# Patient Record
Sex: FEMALE | Race: BLACK OR AFRICAN AMERICAN | NOT HISPANIC OR LATINO | URBAN - METROPOLITAN AREA
[De-identification: names, ages, dates, MRNs, and addresses within clinical notes are randomized per-mention and may not be internally consistent; named-entity substitution may affect disease eponyms.]

---

## 2018-08-10 ENCOUNTER — EMERGENCY (EMERGENCY)
Facility: HOSPITAL | Age: 68
LOS: 1 days | Discharge: HOME | End: 2018-08-10
Attending: EMERGENCY MEDICINE
Payer: COMMERCIAL

## 2018-08-10 VITALS
RESPIRATION RATE: 20 BRPM | TEMPERATURE: 98 F | OXYGEN SATURATION: 96 % | DIASTOLIC BLOOD PRESSURE: 85 MMHG | SYSTOLIC BLOOD PRESSURE: 138 MMHG | HEART RATE: 84 BPM

## 2018-08-10 DIAGNOSIS — Z90.49 ACQUIRED ABSENCE OF OTHER SPECIFIED PARTS OF DIGESTIVE TRACT: Chronic | ICD-10-CM

## 2018-08-10 PROCEDURE — 99283 EMERGENCY DEPT VISIT LOW MDM: CPT

## 2018-08-10 RX ORDER — TOBRAMYCIN SULFATE 40 MG/ML
1 VIAL (ML) INJECTION
Qty: 0 | Refills: 0 | Status: DISCONTINUED | OUTPATIENT
Start: 2018-08-10 | End: 2018-08-14

## 2018-08-10 RX ORDER — VANCOMYCIN HCL 1 G
1 VIAL (EA) INTRAVENOUS
Qty: 0 | Refills: 0 | Status: DISCONTINUED | OUTPATIENT
Start: 2018-08-10 | End: 2018-08-14

## 2018-08-10 RX ORDER — CYCLOPENTOLATE HYDROCHLORIDE 10 MG/ML
1 SOLUTION/ DROPS OPHTHALMIC ONCE
Qty: 0 | Refills: 0 | Status: COMPLETED | OUTPATIENT
Start: 2018-08-10 | End: 2018-08-10

## 2018-08-10 RX ADMIN — CYCLOPENTOLATE HYDROCHLORIDE 1 DROP(S): 10 SOLUTION/ DROPS OPHTHALMIC at 22:44

## 2018-08-10 RX ADMIN — Medication 1 DROP(S): at 22:45

## 2018-08-10 RX ADMIN — Medication 1 DROP(S): at 22:44

## 2018-08-10 NOTE — ED ADULT NURSE NOTE - OBJECTIVE STATEMENT
68 y.o female c c/o L eye redness/d/c and sensitivity since Wednesday. "I thought it was eye strain Ivette been working on the computer" as per pt. Pt denies injury to the eye. No fevers/chills. non contact wearer. Hx of diabetes. Cloudiness noted to L eye c erythremia upon exam. Opto consult pending.

## 2018-08-10 NOTE — ED ADULT TRIAGE NOTE - CHIEF COMPLAINT QUOTE
Left Eye Redness. Per MD Dedrick Hameed "Corneal ulcer left eye, needs culture, call Dr. Razo, Optho resident on call".

## 2018-08-10 NOTE — ED PROVIDER NOTE - EYES, MLM
L eye injected with clear tearing and apparent ulceration over iris at approx 7-8 o'clock position. Pupils equal but difficult to appreciate reaction to light. Direct photophobia of L eye. No consensual photophobia. Markedly diminished visual acuity of L eye. Only able to see shadow/outline.

## 2018-08-10 NOTE — CONSULT NOTE ADULT - SUBJECTIVE AND OBJECTIVE BOX
[facility] Ophthalmology Consult Note    HPI:      PMH: None  Meds: None  POcHx (including surgeries/lasers/trauma):  CE/PCIOL OU, LASIK OU ~10years ago  Drops: None  FamHx: None  Social Hx: None  Allergies: NKDA    ROS:  General (neg), Vision (per HPI), Head and Neck (neg), Pulm (neg), CV (neg), GI (neg),  (neg), Musculoskeletal (neg), Skin/Integ (neg), Neuro (neg), Endocrine (neg), Heme (neg), All/Immuno (neg)    Mood and Affect Appropriate ( x ),  Oriented to Time, Place, and Person x 3 ( x )    Ophthalmology Exam    Visual acuity (sc): 20/25 OD, 20/300 OS  Pupils: PERRL OU, no APD  Ttono: 16 OU  Extraocular movements (EOMs): Full OU, no pain, no diplopia  Confrontational Visual Field (CVF):  Full OU  Color Plates: 12/12 OU    Slit Lamp Exam (SLE)  External:  Flat OU  Lids/Lashes/Lacrimal Ducts: Flat OU    Sclera/Conjunctiva:  W+Q OD, 2+ injected nasally  Cornea: Cl OD, 3.5mm x 2mm epi infiltrate in the nasal cornea with overlying epi defect, no thinning, moderate d-folds, SPK OS  Anterior Chamber: D+Q OD, 2+ cell w/ <0.5mm hypopyon  Iris:  Flat OU  Lens:  PCIOL OU    Fundus Exam: dilated with 1% tropicamide and 2.5% phenylephrine  Approval obtained from primary team for dilation  Patient aware that pupils can remained dilated for at least 4-6 hours  Exam performed with 20D lens    Vitreous: wnl OU  Disc, cup/disc: sharp and pink, 0.4 OU  Macula:  few MA's OU  Vessels:  wnl OU  Periphery: wnl OU    Diagnostic Testing:    Assessment:      Plan:  - fortified vancomycin q1hr OS  - fortified tobramycin q1hr OS  - cyclopentolate 1% TID OS  - corneal cultures taken  -       Follow-Up:  Patient should follow up his/her ophthalmologist or in the [facility] Practice within 1 week of discharge  [address] [place], [place] [id number] [phone] [facility] Ophthalmology Consult Note    HPI: 67 y/o F PMH DMT2, HTN, presents from outside ophthalmologist with corneal ulcer OS. Patient had pain, redness, tearing since Wednesday, attributed to rubbing her eye. She denies contact lens use or other trauma, flashes/floaters.      PMH: DMT2, HTN,   Meds: None  POcHx (including surgeries/lasers/trauma):  CE/PCIOL OU, LASIK OU ~10years ago  Drops: None  FamHx: None  Social Hx: None  Allergies: NKDA    ROS:  General (neg), Vision (per HPI), Head and Neck (neg), Pulm (neg), CV (neg), GI (neg),  (neg), Musculoskeletal (neg), Skin/Integ (neg), Neuro (neg), Endocrine (neg), Heme (neg), All/Immuno (neg)    Mood and Affect Appropriate ( x ),  Oriented to Time, Place, and Person x 3 ( x )    Ophthalmology Exam    Visual acuity (sc): 20/25 OD, 20/300 OS  Pupils: PERRL OU, no APD  Ttono: 16 OU  Extraocular movements (EOMs): Full OU, no pain, no diplopia  Confrontational Visual Field (CVF):  Full OU  Color Plates: 12/12 OU    Slit Lamp Exam (SLE)  External:  Flat OU  Lids/Lashes/Lacrimal Ducts: Flat OU    Sclera/Conjunctiva:  W+Q OD, 2+ injected nasally  Cornea: Cl OD, 3.5mm x 2mm epi infiltrate in the nasal cornea with overlying epi defect ([id number]), no thinning, moderate d-folds, SPK OS  Anterior Chamber: D+Q OD, 2+ cell w/ <0.5mm hypopyon  Iris:  Flat OU  Lens:  PCIOL OU    Fundus Exam: dilated with 1% tropicamide and 2.5% phenylephrine  Approval obtained from primary team for dilation  Patient aware that pupils can remained dilated for at least 4-6 hours  Exam performed with 20D lens    Vitreous: wnl OU  Disc, cup/disc: sharp and pink, 0.4 OU  Macula:  few MA's OU  Vessels:  wnl OU  Periphery: wnl OU    Diagnostic Testing:    Assessment: Corneal Ulcer, OS      Plan:  - fortified vancomycin q1hr OS  - fortified tobramycin q1hr OS  - cyclopentolate 1% TID OS  - corneal cultures taken  - d/w pt, will f/u tomorrow in clinic      Follow-Up:  Patient should follow up his/her ophthalmologist or in the [facility] within 1 week of discharge  [address] [facility] [place] [address] [phone]

## 2018-08-10 NOTE — ED PROVIDER NOTE - OBJECTIVE STATEMENT
69 y/o female with a h/o DM who was referred to the ED for Ophthalmology evaluation out of concern for corneal ulceration. She describes having developed some discomfort in her L eye on Thursday. When she awoke Saturday it increased in intensity and was associated with gradually increasing redness, decreasing visual acuity and tearing. She does not recall any injury or sense of FB and does not wear contacts. Her symptoms have been constant and her discomfort exacerbated by light and relieved to a degree by darkness.

## 2018-08-10 NOTE — ED PROVIDER NOTE - MEDICAL DECISION MAKING DETAILS
Ophthalmology contacted prior to patient's arrival. Informed on call physician of her arrival. He will evaluate in the ED in approx 45 - 60 min.

## 2018-08-11 PROCEDURE — 87102 FUNGUS ISOLATION CULTURE: CPT

## 2018-08-11 PROCEDURE — 87070 CULTURE OTHR SPECIMN AEROBIC: CPT

## 2018-08-11 PROCEDURE — 87529 HSV DNA AMP PROBE: CPT

## 2018-08-11 PROCEDURE — 87186 SC STD MICRODIL/AGAR DIL: CPT

## 2018-08-11 PROCEDURE — 87798 DETECT AGENT NOS DNA AMP: CPT

## 2018-08-11 PROCEDURE — 99284 EMERGENCY DEPT VISIT MOD MDM: CPT

## 2018-08-13 ENCOUNTER — APPOINTMENT (OUTPATIENT)
Dept: OPHTHALMOLOGY | Facility: CLINIC | Age: 68
End: 2018-08-13

## 2018-08-13 PROBLEM — E11.9 TYPE 2 DIABETES MELLITUS WITHOUT COMPLICATIONS: Chronic | Status: ACTIVE | Noted: 2018-08-10

## 2018-08-13 PROBLEM — Z00.00 ENCOUNTER FOR PREVENTIVE HEALTH EXAMINATION: Status: ACTIVE | Noted: 2018-08-13

## 2018-08-14 ENCOUNTER — APPOINTMENT (OUTPATIENT)
Dept: OPHTHALMOLOGY | Facility: CLINIC | Age: 68
End: 2018-08-14

## 2018-08-15 ENCOUNTER — APPOINTMENT (OUTPATIENT)
Dept: OPHTHALMOLOGY | Facility: CLINIC | Age: 68
End: 2018-08-15

## 2018-08-16 ENCOUNTER — APPOINTMENT (OUTPATIENT)
Dept: OPHTHALMOLOGY | Facility: CLINIC | Age: 68
End: 2018-08-16

## 2018-08-16 LAB
-  AMPICILLIN/SULBACTAM: SIGNIFICANT CHANGE UP
-  CEFAZOLIN: SIGNIFICANT CHANGE UP
-  CLINDAMYCIN: SIGNIFICANT CHANGE UP
-  ERYTHROMYCIN: SIGNIFICANT CHANGE UP
-  GENTAMICIN: SIGNIFICANT CHANGE UP
-  MOXIFLOXACIN(AEROBIC): SIGNIFICANT CHANGE UP
-  OXACILLIN: SIGNIFICANT CHANGE UP
-  PENICILLIN: SIGNIFICANT CHANGE UP
-  RIFAMPIN: SIGNIFICANT CHANGE UP
-  TETRACYCLINE: SIGNIFICANT CHANGE UP
-  TRIMETHOPRIM/SULFAMETHOXAZOLE: SIGNIFICANT CHANGE UP
-  VANCOMYCIN: SIGNIFICANT CHANGE UP
CULTURE RESULTS: SIGNIFICANT CHANGE UP
METHOD TYPE: SIGNIFICANT CHANGE UP
ORGANISM # SPEC MICROSCOPIC CNT: SIGNIFICANT CHANGE UP
ORGANISM # SPEC MICROSCOPIC CNT: SIGNIFICANT CHANGE UP
SPECIMEN SOURCE: SIGNIFICANT CHANGE UP

## 2018-08-21 ENCOUNTER — APPOINTMENT (OUTPATIENT)
Dept: OPHTHALMOLOGY | Facility: CLINIC | Age: 68
End: 2018-08-21

## 2018-08-28 ENCOUNTER — APPOINTMENT (OUTPATIENT)
Dept: OPHTHALMOLOGY | Facility: CLINIC | Age: 68
End: 2018-08-28

## 2018-09-11 ENCOUNTER — APPOINTMENT (OUTPATIENT)
Dept: OPHTHALMOLOGY | Facility: CLINIC | Age: 68
End: 2018-09-11

## 2018-11-19 ENCOUNTER — APPOINTMENT (OUTPATIENT)
Dept: OPHTHALMOLOGY | Facility: CLINIC | Age: 68
End: 2018-11-19

## 2018-11-21 ENCOUNTER — APPOINTMENT (OUTPATIENT)
Dept: OPHTHALMOLOGY | Facility: CLINIC | Age: 68
End: 2018-11-21
Payer: MEDICARE

## 2018-11-21 PROCEDURE — 92025 CPTRIZED CORNEAL TOPOGRAPHY: CPT

## 2018-11-21 PROCEDURE — 92250 FUNDUS PHOTOGRAPHY W/I&R: CPT

## 2018-11-21 PROCEDURE — 92004 COMPRE OPH EXAM NEW PT 1/>: CPT

## 2018-11-21 PROCEDURE — 92020 GONIOSCOPY: CPT

## 2018-12-18 ENCOUNTER — APPOINTMENT (OUTPATIENT)
Dept: OPHTHALMOLOGY | Facility: CLINIC | Age: 68
End: 2018-12-18
Payer: MEDICARE

## 2018-12-18 DIAGNOSIS — H43.393 OTHER VITREOUS OPACITIES, BILATERAL: ICD-10-CM

## 2018-12-18 DIAGNOSIS — H40.10X2 UNSPECIFIED OPEN-ANGLE GLAUCOMA, MODERATE STAGE: ICD-10-CM

## 2018-12-18 DIAGNOSIS — H17.9 UNSPECIFIED CORNEAL SCAR AND OPACITY: ICD-10-CM

## 2018-12-18 PROCEDURE — 92134 CPTRZ OPH DX IMG PST SGM RTA: CPT

## 2018-12-18 PROCEDURE — 92014 COMPRE OPH EXAM EST PT 1/>: CPT

## 2018-12-18 PROCEDURE — 92225: CPT | Mod: LT

## 2019-01-24 ENCOUNTER — APPOINTMENT (OUTPATIENT)
Dept: OPHTHALMOLOGY | Facility: CLINIC | Age: 69
End: 2019-01-24
Payer: MEDICARE

## 2019-01-24 PROCEDURE — 92012 INTRM OPH EXAM EST PATIENT: CPT

## 2019-01-24 PROCEDURE — 92134 CPTRZ OPH DX IMG PST SGM RTA: CPT

## 2019-02-06 ENCOUNTER — APPOINTMENT (OUTPATIENT)
Dept: OPHTHALMOLOGY | Facility: CLINIC | Age: 69
End: 2019-02-06
Payer: MEDICARE

## 2019-02-06 PROCEDURE — 92012 INTRM OPH EXAM EST PATIENT: CPT

## 2019-02-06 PROCEDURE — 92083 EXTENDED VISUAL FIELD XM: CPT

## 2019-02-06 PROCEDURE — ZZZZZ: CPT

## 2019-02-06 PROCEDURE — 76514 ECHO EXAM OF EYE THICKNESS: CPT

## 2019-02-06 PROCEDURE — 92133 CPTRZD OPH DX IMG PST SGM ON: CPT

## 2019-04-15 ENCOUNTER — APPOINTMENT (OUTPATIENT)
Dept: OPHTHALMOLOGY | Facility: CLINIC | Age: 69
End: 2019-04-15
Payer: MEDICARE

## 2019-04-15 DIAGNOSIS — D31.31 BENIGN NEOPLASM OF RIGHT CHOROID: ICD-10-CM

## 2019-04-15 DIAGNOSIS — E11.3313 TYPE 2 DIABETES MELLITUS WITH MODERATE NONPROLIFERATIVE DIABETIC RETINOPATHY WITH MACULAR EDEMA, BILATERAL: ICD-10-CM

## 2019-04-15 PROCEDURE — 92012 INTRM OPH EXAM EST PATIENT: CPT

## 2019-04-15 PROCEDURE — 92235 FLUORESCEIN ANGRPH MLTIFRAME: CPT

## 2019-04-15 PROCEDURE — 92134 CPTRZ OPH DX IMG PST SGM RTA: CPT

## 2019-04-15 RX ORDER — LATANOPROST/PF 0.005 %
0.01 DROPS OPHTHALMIC (EYE)
Qty: 1 | Refills: 6 | Status: ACTIVE | COMMUNITY
Start: 2019-04-15 | End: 1900-01-01

## 2019-05-01 ENCOUNTER — NON-APPOINTMENT (OUTPATIENT)
Age: 69
End: 2019-05-01

## 2019-05-01 ENCOUNTER — APPOINTMENT (OUTPATIENT)
Dept: OPHTHALMOLOGY | Facility: CLINIC | Age: 69
End: 2019-05-01
Payer: MEDICARE

## 2019-05-01 PROCEDURE — 92286 ANT SGM IMG I&R SPECLR MIC: CPT

## 2019-05-01 PROCEDURE — 92012 INTRM OPH EXAM EST PATIENT: CPT

## 2019-05-22 ENCOUNTER — NON-APPOINTMENT (OUTPATIENT)
Age: 69
End: 2019-05-22

## 2019-05-22 ENCOUNTER — APPOINTMENT (OUTPATIENT)
Dept: OPHTHALMOLOGY | Facility: CLINIC | Age: 69
End: 2019-05-22
Payer: MEDICARE

## 2019-05-22 PROCEDURE — 92012 INTRM OPH EXAM EST PATIENT: CPT

## 2019-08-19 ENCOUNTER — APPOINTMENT (OUTPATIENT)
Dept: OPHTHALMOLOGY | Facility: CLINIC | Age: 69
End: 2019-08-19

## 2019-09-03 ENCOUNTER — APPOINTMENT (OUTPATIENT)
Dept: OPHTHALMOLOGY | Facility: CLINIC | Age: 69
End: 2019-09-03
Payer: MEDICARE

## 2019-09-03 ENCOUNTER — NON-APPOINTMENT (OUTPATIENT)
Age: 69
End: 2019-09-03

## 2019-09-03 PROCEDURE — 92134 CPTRZ OPH DX IMG PST SGM RTA: CPT

## 2019-09-03 PROCEDURE — 92012 INTRM OPH EXAM EST PATIENT: CPT

## 2019-09-11 ENCOUNTER — NON-APPOINTMENT (OUTPATIENT)
Age: 69
End: 2019-09-11

## 2019-09-11 ENCOUNTER — APPOINTMENT (OUTPATIENT)
Dept: OPHTHALMOLOGY | Facility: CLINIC | Age: 69
End: 2019-09-11
Payer: MEDICARE

## 2019-09-11 PROCEDURE — 92083 EXTENDED VISUAL FIELD XM: CPT

## 2019-09-11 PROCEDURE — 92012 INTRM OPH EXAM EST PATIENT: CPT

## 2019-09-11 PROCEDURE — 92020 GONIOSCOPY: CPT

## 2019-10-01 ENCOUNTER — NON-APPOINTMENT (OUTPATIENT)
Age: 69
End: 2019-10-01

## 2019-10-01 ENCOUNTER — APPOINTMENT (OUTPATIENT)
Dept: OPHTHALMOLOGY | Facility: CLINIC | Age: 69
End: 2019-10-01
Payer: MEDICARE

## 2019-10-01 PROCEDURE — 92012 INTRM OPH EXAM EST PATIENT: CPT

## 2019-10-16 ENCOUNTER — APPOINTMENT (OUTPATIENT)
Dept: OPHTHALMOLOGY | Facility: CLINIC | Age: 69
End: 2019-10-16

## 2019-11-13 ENCOUNTER — NON-APPOINTMENT (OUTPATIENT)
Age: 69
End: 2019-11-13

## 2019-11-13 ENCOUNTER — APPOINTMENT (OUTPATIENT)
Dept: OPHTHALMOLOGY | Facility: CLINIC | Age: 69
End: 2019-11-13
Payer: MEDICARE

## 2019-11-13 PROCEDURE — 92012 INTRM OPH EXAM EST PATIENT: CPT

## 2020-01-06 ENCOUNTER — APPOINTMENT (OUTPATIENT)
Dept: OPHTHALMOLOGY | Facility: CLINIC | Age: 70
End: 2020-01-06
Payer: MEDICARE

## 2020-01-06 ENCOUNTER — NON-APPOINTMENT (OUTPATIENT)
Age: 70
End: 2020-01-06

## 2020-01-06 PROCEDURE — 92012 INTRM OPH EXAM EST PATIENT: CPT

## 2020-01-06 PROCEDURE — 92134 CPTRZ OPH DX IMG PST SGM RTA: CPT

## 2020-01-06 PROCEDURE — 92201 OPSCPY EXTND RTA DRAW UNI/BI: CPT | Mod: RT

## 2020-03-02 ENCOUNTER — APPOINTMENT (OUTPATIENT)
Dept: OPHTHALMOLOGY | Facility: CLINIC | Age: 70
End: 2020-03-02
Payer: MEDICARE

## 2020-03-02 ENCOUNTER — NON-APPOINTMENT (OUTPATIENT)
Age: 70
End: 2020-03-02

## 2020-03-02 PROCEDURE — 67028 INJECTION EYE DRUG: CPT | Mod: RT

## 2020-03-02 PROCEDURE — 92134 CPTRZ OPH DX IMG PST SGM RTA: CPT

## 2020-03-02 PROCEDURE — 92012 INTRM OPH EXAM EST PATIENT: CPT | Mod: 25

## 2020-03-18 ENCOUNTER — NON-APPOINTMENT (OUTPATIENT)
Age: 70
End: 2020-03-18

## 2020-03-18 ENCOUNTER — APPOINTMENT (OUTPATIENT)
Dept: OPHTHALMOLOGY | Facility: CLINIC | Age: 70
End: 2020-03-18
Payer: MEDICARE

## 2020-03-18 PROCEDURE — 92012 INTRM OPH EXAM EST PATIENT: CPT

## 2020-04-06 ENCOUNTER — APPOINTMENT (OUTPATIENT)
Dept: OPHTHALMOLOGY | Facility: CLINIC | Age: 70
End: 2020-04-06
Payer: MEDICARE

## 2020-04-06 ENCOUNTER — NON-APPOINTMENT (OUTPATIENT)
Age: 70
End: 2020-04-06

## 2020-04-06 PROCEDURE — 92012 INTRM OPH EXAM EST PATIENT: CPT | Mod: 25

## 2020-04-06 PROCEDURE — 67028 INJECTION EYE DRUG: CPT | Mod: RT

## 2020-04-06 PROCEDURE — 92134 CPTRZ OPH DX IMG PST SGM RTA: CPT

## 2020-06-01 ENCOUNTER — APPOINTMENT (OUTPATIENT)
Dept: OPHTHALMOLOGY | Facility: CLINIC | Age: 70
End: 2020-06-01
Payer: MEDICARE

## 2020-06-01 ENCOUNTER — NON-APPOINTMENT (OUTPATIENT)
Age: 70
End: 2020-06-01

## 2020-06-01 PROCEDURE — 92014 COMPRE OPH EXAM EST PT 1/>: CPT

## 2020-06-01 PROCEDURE — 92134 CPTRZ OPH DX IMG PST SGM RTA: CPT

## 2020-06-01 PROCEDURE — 92235 FLUORESCEIN ANGRPH MLTIFRAME: CPT

## 2020-08-13 ENCOUNTER — APPOINTMENT (OUTPATIENT)
Dept: OPHTHALMOLOGY | Facility: CLINIC | Age: 70
End: 2020-08-13
Payer: MEDICARE

## 2020-08-13 ENCOUNTER — NON-APPOINTMENT (OUTPATIENT)
Age: 70
End: 2020-08-13

## 2020-08-13 PROCEDURE — 92014 COMPRE OPH EXAM EST PT 1/>: CPT

## 2020-08-13 PROCEDURE — 92083 EXTENDED VISUAL FIELD XM: CPT

## 2020-08-13 PROCEDURE — 92012 INTRM OPH EXAM EST PATIENT: CPT

## 2020-08-13 PROCEDURE — 92133 CPTRZD OPH DX IMG PST SGM ON: CPT

## 2020-09-02 ENCOUNTER — APPOINTMENT (OUTPATIENT)
Dept: OPHTHALMOLOGY | Facility: CLINIC | Age: 70
End: 2020-09-02
Payer: MEDICARE

## 2020-09-02 ENCOUNTER — NON-APPOINTMENT (OUTPATIENT)
Age: 70
End: 2020-09-02

## 2020-09-02 PROCEDURE — 92134 CPTRZ OPH DX IMG PST SGM RTA: CPT

## 2020-09-02 PROCEDURE — 67028 INJECTION EYE DRUG: CPT | Mod: RT

## 2020-09-02 PROCEDURE — 92012 INTRM OPH EXAM EST PATIENT: CPT | Mod: 25

## 2020-09-23 ENCOUNTER — APPOINTMENT (OUTPATIENT)
Dept: OPHTHALMOLOGY | Facility: CLINIC | Age: 70
End: 2020-09-23
Payer: MEDICARE

## 2020-09-23 ENCOUNTER — NON-APPOINTMENT (OUTPATIENT)
Age: 70
End: 2020-09-23

## 2020-09-23 PROCEDURE — ZZZZZ: CPT

## 2020-09-23 PROCEDURE — 66821 AFTER CATARACT LASER SURGERY: CPT | Mod: LT

## 2020-10-21 ENCOUNTER — APPOINTMENT (OUTPATIENT)
Dept: OPHTHALMOLOGY | Facility: CLINIC | Age: 70
End: 2020-10-21
Payer: MEDICARE

## 2020-10-21 ENCOUNTER — NON-APPOINTMENT (OUTPATIENT)
Age: 70
End: 2020-10-21

## 2020-10-21 PROCEDURE — 99024 POSTOP FOLLOW-UP VISIT: CPT

## 2020-10-21 PROCEDURE — 99072 ADDL SUPL MATRL&STAF TM PHE: CPT

## 2020-10-21 PROCEDURE — 92134 CPTRZ OPH DX IMG PST SGM RTA: CPT

## 2020-12-02 ENCOUNTER — APPOINTMENT (OUTPATIENT)
Dept: OPHTHALMOLOGY | Facility: CLINIC | Age: 70
End: 2020-12-02
Payer: MEDICARE

## 2020-12-02 ENCOUNTER — NON-APPOINTMENT (OUTPATIENT)
Age: 70
End: 2020-12-02

## 2020-12-02 PROCEDURE — 92012 INTRM OPH EXAM EST PATIENT: CPT | Mod: 25

## 2020-12-02 PROCEDURE — 67028 INJECTION EYE DRUG: CPT | Mod: RT,79

## 2020-12-02 PROCEDURE — 92134 CPTRZ OPH DX IMG PST SGM RTA: CPT

## 2020-12-02 PROCEDURE — 99072 ADDL SUPL MATRL&STAF TM PHE: CPT

## 2021-01-06 ENCOUNTER — APPOINTMENT (OUTPATIENT)
Dept: OPHTHALMOLOGY | Facility: CLINIC | Age: 71
End: 2021-01-06
Payer: MEDICARE

## 2021-01-06 ENCOUNTER — NON-APPOINTMENT (OUTPATIENT)
Age: 71
End: 2021-01-06

## 2021-01-06 PROCEDURE — 92134 CPTRZ OPH DX IMG PST SGM RTA: CPT

## 2021-01-06 PROCEDURE — 67028 INJECTION EYE DRUG: CPT | Mod: RT

## 2021-01-06 PROCEDURE — 99072 ADDL SUPL MATRL&STAF TM PHE: CPT

## 2021-03-17 ENCOUNTER — APPOINTMENT (OUTPATIENT)
Dept: OPHTHALMOLOGY | Facility: CLINIC | Age: 71
End: 2021-03-17
Payer: MEDICARE

## 2021-03-17 ENCOUNTER — NON-APPOINTMENT (OUTPATIENT)
Age: 71
End: 2021-03-17

## 2021-03-17 PROCEDURE — 67028 INJECTION EYE DRUG: CPT | Mod: RT

## 2021-03-17 PROCEDURE — 99072 ADDL SUPL MATRL&STAF TM PHE: CPT

## 2021-03-17 PROCEDURE — 92134 CPTRZ OPH DX IMG PST SGM RTA: CPT

## 2021-03-17 PROCEDURE — 92012 INTRM OPH EXAM EST PATIENT: CPT | Mod: 25

## 2021-04-07 ENCOUNTER — NON-APPOINTMENT (OUTPATIENT)
Age: 71
End: 2021-04-07

## 2021-04-07 ENCOUNTER — APPOINTMENT (OUTPATIENT)
Dept: OPHTHALMOLOGY | Facility: CLINIC | Age: 71
End: 2021-04-07
Payer: MEDICARE

## 2021-04-07 PROCEDURE — 92012 INTRM OPH EXAM EST PATIENT: CPT

## 2021-04-07 PROCEDURE — 99072 ADDL SUPL MATRL&STAF TM PHE: CPT

## 2021-04-21 ENCOUNTER — NON-APPOINTMENT (OUTPATIENT)
Age: 71
End: 2021-04-21

## 2021-04-21 ENCOUNTER — APPOINTMENT (OUTPATIENT)
Dept: OPHTHALMOLOGY | Facility: CLINIC | Age: 71
End: 2021-04-21
Payer: MEDICARE

## 2021-04-21 PROCEDURE — 67210 TREATMENT OF RETINAL LESION: CPT | Mod: RT

## 2021-04-21 PROCEDURE — 99072 ADDL SUPL MATRL&STAF TM PHE: CPT

## 2021-04-21 PROCEDURE — 92134 CPTRZ OPH DX IMG PST SGM RTA: CPT

## 2021-06-16 ENCOUNTER — NON-APPOINTMENT (OUTPATIENT)
Age: 71
End: 2021-06-16

## 2021-06-16 ENCOUNTER — APPOINTMENT (OUTPATIENT)
Dept: OPHTHALMOLOGY | Facility: CLINIC | Age: 71
End: 2021-06-16
Payer: MEDICARE

## 2021-06-16 PROCEDURE — 92134 CPTRZ OPH DX IMG PST SGM RTA: CPT

## 2021-06-16 PROCEDURE — 67028 INJECTION EYE DRUG: CPT | Mod: 79,RT

## 2021-06-16 PROCEDURE — 92012 INTRM OPH EXAM EST PATIENT: CPT | Mod: 25,24

## 2021-07-21 ENCOUNTER — APPOINTMENT (OUTPATIENT)
Dept: OPHTHALMOLOGY | Facility: CLINIC | Age: 71
End: 2021-07-21
Payer: MEDICARE

## 2021-07-21 ENCOUNTER — NON-APPOINTMENT (OUTPATIENT)
Age: 71
End: 2021-07-21

## 2021-07-21 PROCEDURE — 92012 INTRM OPH EXAM EST PATIENT: CPT

## 2021-07-21 PROCEDURE — 92134 CPTRZ OPH DX IMG PST SGM RTA: CPT

## 2021-09-01 ENCOUNTER — APPOINTMENT (OUTPATIENT)
Dept: OPHTHALMOLOGY | Facility: CLINIC | Age: 71
End: 2021-09-01
Payer: MEDICARE

## 2021-09-01 ENCOUNTER — NON-APPOINTMENT (OUTPATIENT)
Age: 71
End: 2021-09-01

## 2021-09-01 PROCEDURE — 92134 CPTRZ OPH DX IMG PST SGM RTA: CPT

## 2021-09-01 PROCEDURE — 92014 COMPRE OPH EXAM EST PT 1/>: CPT

## 2021-10-13 ENCOUNTER — NON-APPOINTMENT (OUTPATIENT)
Age: 71
End: 2021-10-13

## 2021-10-13 ENCOUNTER — APPOINTMENT (OUTPATIENT)
Dept: OPHTHALMOLOGY | Facility: CLINIC | Age: 71
End: 2021-10-13
Payer: MEDICARE

## 2021-10-13 PROCEDURE — 92133 CPTRZD OPH DX IMG PST SGM ON: CPT

## 2021-10-13 PROCEDURE — 92083 EXTENDED VISUAL FIELD XM: CPT

## 2021-10-13 PROCEDURE — 92012 INTRM OPH EXAM EST PATIENT: CPT

## 2021-10-20 ENCOUNTER — APPOINTMENT (OUTPATIENT)
Dept: OPHTHALMOLOGY | Facility: CLINIC | Age: 71
End: 2021-10-20
Payer: MEDICARE

## 2021-10-20 ENCOUNTER — NON-APPOINTMENT (OUTPATIENT)
Age: 71
End: 2021-10-20

## 2021-10-20 PROCEDURE — 67028 INJECTION EYE DRUG: CPT | Mod: RT

## 2021-10-20 PROCEDURE — 92012 INTRM OPH EXAM EST PATIENT: CPT | Mod: 25

## 2021-10-20 PROCEDURE — 92134 CPTRZ OPH DX IMG PST SGM RTA: CPT

## 2021-10-28 ENCOUNTER — APPOINTMENT (OUTPATIENT)
Dept: OPHTHALMOLOGY | Facility: CLINIC | Age: 71
End: 2021-10-28

## 2021-11-02 ENCOUNTER — NON-APPOINTMENT (OUTPATIENT)
Age: 71
End: 2021-11-02

## 2021-11-02 ENCOUNTER — APPOINTMENT (OUTPATIENT)
Dept: OPHTHALMOLOGY | Facility: CLINIC | Age: 71
End: 2021-11-02
Payer: MEDICARE

## 2021-11-02 PROCEDURE — 92012 INTRM OPH EXAM EST PATIENT: CPT

## 2021-11-02 PROCEDURE — 92020 GONIOSCOPY: CPT

## 2021-11-30 ENCOUNTER — APPOINTMENT (OUTPATIENT)
Dept: OPHTHALMOLOGY | Facility: CLINIC | Age: 71
End: 2021-11-30

## 2021-12-01 ENCOUNTER — APPOINTMENT (OUTPATIENT)
Dept: OPHTHALMOLOGY | Facility: CLINIC | Age: 71
End: 2021-12-01

## 2021-12-23 ENCOUNTER — APPOINTMENT (OUTPATIENT)
Dept: OPHTHALMOLOGY | Facility: CLINIC | Age: 71
End: 2021-12-23
Payer: MEDICARE

## 2021-12-23 ENCOUNTER — NON-APPOINTMENT (OUTPATIENT)
Age: 71
End: 2021-12-23

## 2021-12-23 PROCEDURE — 65855 TRABECULOPLASTY LASER SURG: CPT | Mod: LT

## 2021-12-23 PROCEDURE — 92012 INTRM OPH EXAM EST PATIENT: CPT | Mod: 25

## 2021-12-27 ENCOUNTER — APPOINTMENT (OUTPATIENT)
Dept: OPHTHALMOLOGY | Facility: CLINIC | Age: 71
End: 2021-12-27

## 2022-01-04 ENCOUNTER — APPOINTMENT (OUTPATIENT)
Dept: OPHTHALMOLOGY | Facility: CLINIC | Age: 72
End: 2022-01-04
Payer: MEDICARE

## 2022-01-04 ENCOUNTER — NON-APPOINTMENT (OUTPATIENT)
Age: 72
End: 2022-01-04

## 2022-01-04 PROCEDURE — 92012 INTRM OPH EXAM EST PATIENT: CPT

## 2022-01-19 ENCOUNTER — NON-APPOINTMENT (OUTPATIENT)
Age: 72
End: 2022-01-19

## 2022-01-19 ENCOUNTER — APPOINTMENT (OUTPATIENT)
Dept: OPHTHALMOLOGY | Facility: CLINIC | Age: 72
End: 2022-01-19
Payer: MEDICARE

## 2022-01-19 PROCEDURE — 92012 INTRM OPH EXAM EST PATIENT: CPT | Mod: 25

## 2022-01-19 PROCEDURE — 67028 INJECTION EYE DRUG: CPT | Mod: RT

## 2022-01-19 PROCEDURE — 92250 FUNDUS PHOTOGRAPHY W/I&R: CPT

## 2022-01-19 PROCEDURE — 92235 FLUORESCEIN ANGRPH MLTIFRAME: CPT

## 2022-02-16 ENCOUNTER — NON-APPOINTMENT (OUTPATIENT)
Age: 72
End: 2022-02-16

## 2022-02-16 ENCOUNTER — APPOINTMENT (OUTPATIENT)
Dept: OPHTHALMOLOGY | Facility: CLINIC | Age: 72
End: 2022-02-16
Payer: MEDICARE

## 2022-02-16 PROCEDURE — 92134 CPTRZ OPH DX IMG PST SGM RTA: CPT

## 2022-02-16 PROCEDURE — 67028 INJECTION EYE DRUG: CPT | Mod: RT

## 2022-02-17 ENCOUNTER — APPOINTMENT (OUTPATIENT)
Dept: OPHTHALMOLOGY | Facility: CLINIC | Age: 72
End: 2022-02-17
Payer: MEDICARE

## 2022-02-17 ENCOUNTER — NON-APPOINTMENT (OUTPATIENT)
Age: 72
End: 2022-02-17

## 2022-02-17 PROCEDURE — 92012 INTRM OPH EXAM EST PATIENT: CPT

## 2022-03-31 ENCOUNTER — NON-APPOINTMENT (OUTPATIENT)
Age: 72
End: 2022-03-31

## 2022-03-31 ENCOUNTER — APPOINTMENT (OUTPATIENT)
Dept: OPHTHALMOLOGY | Facility: CLINIC | Age: 72
End: 2022-03-31
Payer: MEDICARE

## 2022-03-31 PROCEDURE — 92012 INTRM OPH EXAM EST PATIENT: CPT

## 2022-04-06 ENCOUNTER — NON-APPOINTMENT (OUTPATIENT)
Age: 72
End: 2022-04-06

## 2022-04-06 ENCOUNTER — APPOINTMENT (OUTPATIENT)
Dept: OPHTHALMOLOGY | Facility: CLINIC | Age: 72
End: 2022-04-06
Payer: MEDICARE

## 2022-04-06 PROCEDURE — 92012 INTRM OPH EXAM EST PATIENT: CPT | Mod: 25

## 2022-04-06 PROCEDURE — 92134 CPTRZ OPH DX IMG PST SGM RTA: CPT

## 2022-04-06 PROCEDURE — 67028 INJECTION EYE DRUG: CPT | Mod: RT

## 2022-04-18 ENCOUNTER — APPOINTMENT (OUTPATIENT)
Dept: OPHTHALMOLOGY | Facility: CLINIC | Age: 72
End: 2022-04-18
Payer: MEDICARE

## 2022-04-18 ENCOUNTER — NON-APPOINTMENT (OUTPATIENT)
Age: 72
End: 2022-04-18

## 2022-04-18 PROCEDURE — 92250 FUNDUS PHOTOGRAPHY W/I&R: CPT

## 2022-04-18 PROCEDURE — 92014 COMPRE OPH EXAM EST PT 1/>: CPT

## 2022-04-29 ENCOUNTER — APPOINTMENT (OUTPATIENT)
Dept: OPHTHALMOLOGY | Facility: CLINIC | Age: 72
End: 2022-04-29
Payer: MEDICARE

## 2022-04-29 ENCOUNTER — NON-APPOINTMENT (OUTPATIENT)
Age: 72
End: 2022-04-29

## 2022-04-29 PROCEDURE — 92012 INTRM OPH EXAM EST PATIENT: CPT

## 2022-06-15 ENCOUNTER — APPOINTMENT (OUTPATIENT)
Dept: OPHTHALMOLOGY | Facility: CLINIC | Age: 72
End: 2022-06-15
Payer: MEDICARE

## 2022-06-15 ENCOUNTER — NON-APPOINTMENT (OUTPATIENT)
Age: 72
End: 2022-06-15

## 2022-06-15 PROCEDURE — 67028 INJECTION EYE DRUG: CPT | Mod: RT

## 2022-06-15 PROCEDURE — 92134 CPTRZ OPH DX IMG PST SGM RTA: CPT

## 2022-06-15 PROCEDURE — 92012 INTRM OPH EXAM EST PATIENT: CPT | Mod: 25

## 2022-06-30 ENCOUNTER — APPOINTMENT (OUTPATIENT)
Dept: OPHTHALMOLOGY | Facility: CLINIC | Age: 72
End: 2022-06-30

## 2022-06-30 ENCOUNTER — NON-APPOINTMENT (OUTPATIENT)
Age: 72
End: 2022-06-30

## 2022-06-30 PROCEDURE — 92133 CPTRZD OPH DX IMG PST SGM ON: CPT

## 2022-06-30 PROCEDURE — 92012 INTRM OPH EXAM EST PATIENT: CPT

## 2022-07-28 ENCOUNTER — APPOINTMENT (OUTPATIENT)
Dept: OPHTHALMOLOGY | Facility: CLINIC | Age: 72
End: 2022-07-28

## 2022-07-28 ENCOUNTER — NON-APPOINTMENT (OUTPATIENT)
Age: 72
End: 2022-07-28

## 2022-07-28 PROCEDURE — 92083 EXTENDED VISUAL FIELD XM: CPT

## 2022-07-28 PROCEDURE — 92012 INTRM OPH EXAM EST PATIENT: CPT

## 2022-08-15 ENCOUNTER — APPOINTMENT (OUTPATIENT)
Dept: OPHTHALMOLOGY | Facility: AMBULATORY SURGERY CENTER | Age: 72
End: 2022-08-15

## 2022-08-15 PROCEDURE — 66180 AQUEOUS SHUNT EYE W/GRAFT: CPT | Mod: RT

## 2022-08-16 ENCOUNTER — NON-APPOINTMENT (OUTPATIENT)
Age: 72
End: 2022-08-16

## 2022-08-16 ENCOUNTER — APPOINTMENT (OUTPATIENT)
Dept: OPHTHALMOLOGY | Facility: CLINIC | Age: 72
End: 2022-08-16

## 2022-08-16 PROCEDURE — 99024 POSTOP FOLLOW-UP VISIT: CPT

## 2022-08-17 ENCOUNTER — APPOINTMENT (OUTPATIENT)
Dept: OPHTHALMOLOGY | Facility: CLINIC | Age: 72
End: 2022-08-17

## 2022-08-25 ENCOUNTER — NON-APPOINTMENT (OUTPATIENT)
Age: 72
End: 2022-08-25

## 2022-08-25 ENCOUNTER — APPOINTMENT (OUTPATIENT)
Dept: OPHTHALMOLOGY | Facility: CLINIC | Age: 72
End: 2022-08-25

## 2022-08-25 PROCEDURE — 99024 POSTOP FOLLOW-UP VISIT: CPT

## 2022-09-06 ENCOUNTER — APPOINTMENT (OUTPATIENT)
Dept: OPHTHALMOLOGY | Facility: CLINIC | Age: 72
End: 2022-09-06

## 2022-09-06 ENCOUNTER — NON-APPOINTMENT (OUTPATIENT)
Age: 72
End: 2022-09-06

## 2022-09-06 ENCOUNTER — EMERGENCY (EMERGENCY)
Facility: HOSPITAL | Age: 72
LOS: 1 days | Discharge: HOME | End: 2022-09-06
Attending: STUDENT IN AN ORGANIZED HEALTH CARE EDUCATION/TRAINING PROGRAM
Payer: COMMERCIAL

## 2022-09-06 VITALS — WEIGHT: 162.92 LBS | HEIGHT: 66 IN

## 2022-09-06 DIAGNOSIS — Z90.49 ACQUIRED ABSENCE OF OTHER SPECIFIED PARTS OF DIGESTIVE TRACT: Chronic | ICD-10-CM

## 2022-09-06 LAB
ALBUMIN SERPL ELPH-MCNC: 4.6 G/DL — SIGNIFICANT CHANGE UP (ref 3.3–5)
ALP SERPL-CCNC: 85 U/L — SIGNIFICANT CHANGE UP (ref 40–120)
ALT FLD-CCNC: 16 U/L — SIGNIFICANT CHANGE UP (ref 10–45)
ANION GAP SERPL CALC-SCNC: 15 MMOL/L — SIGNIFICANT CHANGE UP (ref 5–17)
AST SERPL-CCNC: 20 U/L — SIGNIFICANT CHANGE UP (ref 10–40)
BASOPHILS # BLD AUTO: 0.13 K/UL — SIGNIFICANT CHANGE UP (ref 0–0.2)
BASOPHILS NFR BLD AUTO: 1.7 % — SIGNIFICANT CHANGE UP (ref 0–2)
BILIRUB SERPL-MCNC: 0.6 MG/DL — SIGNIFICANT CHANGE UP (ref 0.2–1.2)
BUN SERPL-MCNC: 12 MG/DL — SIGNIFICANT CHANGE UP (ref 7–23)
CALCIUM SERPL-MCNC: 9.7 MG/DL — SIGNIFICANT CHANGE UP (ref 8.4–10.5)
CHLORIDE SERPL-SCNC: 104 MMOL/L — SIGNIFICANT CHANGE UP (ref 96–108)
CO2 SERPL-SCNC: 23 MMOL/L — SIGNIFICANT CHANGE UP (ref 22–31)
CREAT SERPL-MCNC: 0.72 MG/DL — SIGNIFICANT CHANGE UP (ref 0.5–1.3)
CRP SERPL-MCNC: <3 MG/L — SIGNIFICANT CHANGE UP (ref 0–4)
EGFR: 88 ML/MIN/1.73M2 — SIGNIFICANT CHANGE UP
EOSINOPHIL # BLD AUTO: 0.09 K/UL — SIGNIFICANT CHANGE UP (ref 0–0.5)
EOSINOPHIL NFR BLD AUTO: 1.2 % — SIGNIFICANT CHANGE UP (ref 0–6)
FLUAV AG NPH QL: SIGNIFICANT CHANGE UP
FLUBV AG NPH QL: SIGNIFICANT CHANGE UP
GLUCOSE BLDC GLUCOMTR-MCNC: 183 MG/DL — HIGH (ref 70–99)
GLUCOSE SERPL-MCNC: 161 MG/DL — HIGH (ref 70–99)
HCT VFR BLD CALC: 45.7 % — HIGH (ref 34.5–45)
HGB BLD-MCNC: 13.9 G/DL — SIGNIFICANT CHANGE UP (ref 11.5–15.5)
IMM GRANULOCYTES NFR BLD AUTO: 0.3 % — SIGNIFICANT CHANGE UP (ref 0–1.5)
LYMPHOCYTES # BLD AUTO: 2.25 K/UL — SIGNIFICANT CHANGE UP (ref 1–3.3)
LYMPHOCYTES # BLD AUTO: 29 % — SIGNIFICANT CHANGE UP (ref 13–44)
MCHC RBC-ENTMCNC: 25.6 PG — LOW (ref 27–34)
MCHC RBC-ENTMCNC: 30.4 GM/DL — LOW (ref 32–36)
MCV RBC AUTO: 84.3 FL — SIGNIFICANT CHANGE UP (ref 80–100)
MONOCYTES # BLD AUTO: 0.48 K/UL — SIGNIFICANT CHANGE UP (ref 0–0.9)
MONOCYTES NFR BLD AUTO: 6.2 % — SIGNIFICANT CHANGE UP (ref 2–14)
NEUTROPHILS # BLD AUTO: 4.79 K/UL — SIGNIFICANT CHANGE UP (ref 1.8–7.4)
NEUTROPHILS NFR BLD AUTO: 61.6 % — SIGNIFICANT CHANGE UP (ref 43–77)
NRBC # BLD: 0 /100 WBCS — SIGNIFICANT CHANGE UP (ref 0–0)
PLATELET # BLD AUTO: 354 K/UL — SIGNIFICANT CHANGE UP (ref 150–400)
POTASSIUM SERPL-MCNC: 4.2 MMOL/L — SIGNIFICANT CHANGE UP (ref 3.5–5.3)
POTASSIUM SERPL-SCNC: 4.2 MMOL/L — SIGNIFICANT CHANGE UP (ref 3.5–5.3)
PROT SERPL-MCNC: 7.4 G/DL — SIGNIFICANT CHANGE UP (ref 6–8.3)
RBC # BLD: 5.42 M/UL — HIGH (ref 3.8–5.2)
RBC # FLD: 14.4 % — SIGNIFICANT CHANGE UP (ref 10.3–14.5)
RSV RNA NPH QL NAA+NON-PROBE: SIGNIFICANT CHANGE UP
SARS-COV-2 RNA SPEC QL NAA+PROBE: SIGNIFICANT CHANGE UP
SODIUM SERPL-SCNC: 142 MMOL/L — SIGNIFICANT CHANGE UP (ref 135–145)
WBC # BLD: 7.76 K/UL — SIGNIFICANT CHANGE UP (ref 3.8–10.5)
WBC # FLD AUTO: 7.76 K/UL — SIGNIFICANT CHANGE UP (ref 3.8–10.5)

## 2022-09-06 PROCEDURE — 70481 CT ORBIT/EAR/FOSSA W/DYE: CPT | Mod: 26,MA

## 2022-09-06 PROCEDURE — 70543 MRI ORBT/FAC/NCK W/O &W/DYE: CPT | Mod: 26,MA

## 2022-09-06 PROCEDURE — 99024 POSTOP FOLLOW-UP VISIT: CPT

## 2022-09-06 PROCEDURE — 70553 MRI BRAIN STEM W/O & W/DYE: CPT | Mod: 26,MA

## 2022-09-06 PROCEDURE — 99234 HOSP IP/OBS SM DT SF/LOW 45: CPT

## 2022-09-06 PROCEDURE — 99220: CPT

## 2022-09-06 PROCEDURE — 70498 CT ANGIOGRAPHY NECK: CPT | Mod: 26,MA

## 2022-09-06 PROCEDURE — 70496 CT ANGIOGRAPHY HEAD: CPT | Mod: 26,MA

## 2022-09-06 RX ORDER — GLUCAGON INJECTION, SOLUTION 0.5 MG/.1ML
1 INJECTION, SOLUTION SUBCUTANEOUS ONCE
Refills: 0 | Status: DISCONTINUED | OUTPATIENT
Start: 2022-09-06 | End: 2022-09-10

## 2022-09-06 RX ORDER — DEXTROSE 50 % IN WATER 50 %
25 SYRINGE (ML) INTRAVENOUS ONCE
Refills: 0 | Status: DISCONTINUED | OUTPATIENT
Start: 2022-09-06 | End: 2022-09-10

## 2022-09-06 RX ORDER — SODIUM CHLORIDE 9 MG/ML
1000 INJECTION, SOLUTION INTRAVENOUS
Refills: 0 | Status: DISCONTINUED | OUTPATIENT
Start: 2022-09-06 | End: 2022-09-10

## 2022-09-06 RX ORDER — ASPIRIN/CALCIUM CARB/MAGNESIUM 324 MG
81 TABLET ORAL DAILY
Refills: 0 | Status: DISCONTINUED | OUTPATIENT
Start: 2022-09-06 | End: 2022-09-10

## 2022-09-06 RX ORDER — DEXTROSE 50 % IN WATER 50 %
15 SYRINGE (ML) INTRAVENOUS ONCE
Refills: 0 | Status: DISCONTINUED | OUTPATIENT
Start: 2022-09-06 | End: 2022-09-10

## 2022-09-06 RX ORDER — ATORVASTATIN CALCIUM 80 MG/1
40 TABLET, FILM COATED ORAL AT BEDTIME
Refills: 0 | Status: DISCONTINUED | OUTPATIENT
Start: 2022-09-06 | End: 2022-09-10

## 2022-09-06 RX ORDER — DEXTROSE 50 % IN WATER 50 %
12.5 SYRINGE (ML) INTRAVENOUS ONCE
Refills: 0 | Status: DISCONTINUED | OUTPATIENT
Start: 2022-09-06 | End: 2022-09-10

## 2022-09-06 RX ORDER — LATANOPROST 0.05 MG/ML
1 SOLUTION/ DROPS OPHTHALMIC; TOPICAL AT BEDTIME
Refills: 0 | Status: DISCONTINUED | OUTPATIENT
Start: 2022-09-06 | End: 2022-09-10

## 2022-09-06 RX ORDER — INSULIN LISPRO 100/ML
VIAL (ML) SUBCUTANEOUS
Refills: 0 | Status: DISCONTINUED | OUTPATIENT
Start: 2022-09-06 | End: 2022-09-10

## 2022-09-06 RX ADMIN — LATANOPROST 1 DROP(S): 0.05 SOLUTION/ DROPS OPHTHALMIC; TOPICAL at 21:32

## 2022-09-06 RX ADMIN — Medication 81 MILLIGRAM(S): at 21:32

## 2022-09-06 RX ADMIN — ATORVASTATIN CALCIUM 40 MILLIGRAM(S): 80 TABLET, FILM COATED ORAL at 21:32

## 2022-09-06 NOTE — ED PROVIDER NOTE - CONTACT TIME
pt BIBA from home  pt recently had Magallanes placed, and was set to come out tomorrow urine in bag noted to be very dark pt c/o discomfort at magallanes site , w/ fever and weakness
06-Sep-2022 14:29

## 2022-09-06 NOTE — ED CDU PROVIDER DISPOSITION NOTE - ATTENDING CONTRIBUTION TO CARE
MD Mo:  I have personally performed a face to face diagnostic evaluation on this patient with the PA.  I have reviewed the ACP note and agree with the history, exam, and plan of care, except as noted.  History and Exam by me shows a 73 y/o female, sent to the CDU for evaluation of L CNIII palsy.    CTA imaging in the ED did not reveal etiology; MRI in the CDU is still pending.    Context:  onset of symptoms was < 24 hrs after baerveldt implant to left eye on 8/15/22.   Finally took off her eye patch and noticed the L eye droopiness and double vision.  She then went to see Dr. Berman in clinic yesterday, and was directed to the ED for evaluation of left CN III/IV palsy.  Per our conversations with Ophthalmology, they do not feel that this is a complication of the procedure.  Patient is a diabetic.     ED workup thus far has been negative for CVA; CT/CTA showed age-appropriate changes; MRI brain pending.    VS: wnl.  Face/eyes:  L eye with ptosis and deviated up and out.  No other facial droop appreciated; no slurred speech.  No ataxia.  strength 5/5 throughout.    Impression:  L CN III palsy, either due to DM or other cause.    Plan:  will discuss further recommendations with Ophtho team re: possibility that this is complication of procedure or not.  Seen by Neuro attending in the ED yesterday, who feel that this is a L 3rd nerve palsy.  If MRI unremarkable, Dx most likely L CN III palsy and will dispo accordingly. MD Mo:  I have personally performed a face to face diagnostic evaluation on this patient with the PA.  I have reviewed the ACP note and agree with the history, exam, and plan of care, except as noted.  History and Exam by me shows a 71 y/o female, sent to the CDU for evaluation of L CNIII palsy.    CTA imaging in the ED did not reveal etiology; MRI in the CDU is still pending.    Context:  onset of symptoms was < 24 hrs after baerveldt implant to left eye on 8/15/22.   Finally took off her eye patch and noticed the L eye droopiness and double vision.  She then went to see Dr. Berman in clinic yesterday, and was directed to the ED for evaluation of left CN III/IV palsy.  Per our conversations with Ophthalmology, they do not feel that this is a complication of the procedure.  Patient is a diabetic.     ED workup thus far has been negative for CVA; CT/CTA showed age-appropriate changes; MRI brain pending.    VS: wnl.  Face/eyes:  L eye with ptosis and deviated up and out.  No other facial droop appreciated; no slurred speech.  No ataxia.  strength 5/5 throughout.    Impression:  L CN III palsy, either due to DM or other cause.    Plan:  will discuss further recommendations with Ophtho team re: possibility that this is complication of procedure or not.  Seen by Neuro attending in the ED yesterday, who feel that this is a L 3rd nerve palsy.  MRI shows normal brain Case discussed directly with Dr. Berman, who feels that the baerveldt implant is functioning properly, and that the MRI confirms a diagnosis of CN 3 and likely 4th nerve palsies, most likely due to microvascular disease. and that there is no need for further Ophtho evaluation/intervention at this time.  She may be safely dc'd to f/u with Ophtho as an outpatient.  Return precautions.

## 2022-09-06 NOTE — ED PROVIDER NOTE - CLINICAL SUMMARY MEDICAL DECISION MAKING FREE TEXT BOX
Preeti JERNIGAN) - 73 yo F hx of glaucoma, HTN, DM, recent procedure to left eye presenting from ophtho clinic for concern for multiple cranial nerve palsies. States last week she noted left eye droop and left eye double vision causing her to have unsteady gait. States this is new for her. Son at bedside states pts left eye appears lazy. Ophthalmologist sent pt in to r/o stroke or mass or aneurysm causing CN palsies.   On exam vitals unremarkable. Pt has normal strength in all extremities. CN exam significant for CN 3 palsy and possible CN 4 palsy as well. Will get CT/CTA head and neck and CT orbits. Will call neuro and ophtho and dispo.

## 2022-09-06 NOTE — ED PROVIDER NOTE - ATTENDING APP SHARED VISIT CONTRIBUTION OF CARE
Attending (Jimy Álvarez M.D.):  I have personally seen and examined this patient. I have performed a substantive portion of the visit including all aspects of the medical decision making. Resident and/or ACP note reviewed. I agree on the plan of care except where noted.    See MDM

## 2022-09-06 NOTE — ED PROVIDER NOTE - PROGRESS NOTE DETAILS
LIUDMILA Goldstein: spoke to opthalmology resident who stated their attending did a full exam in the office today which was normal and concern for neurologic disorders therefore no need for re-eval by optho resident this afternoon. seen by neuro attending Dr. Albarado who recommended mr brain and orbit w/ and without

## 2022-09-06 NOTE — CONSULT NOTE ADULT - ASSESSMENT
72F PMHx HLD, T2DM, and glaucoma s/p Baerveldt implant (8/15/22) presenting from optho clinic after 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 Pt w/ recent h/o Left eye procedure and a few weeks post-procedure developed cranial nerve abnormalities. Vitals stable. On exam, pt w/ CN3 and 4 nerve palsy where pt's Left eye at baseline appears to be "down and out." Otherwise, non-focal. CBC and CMP WNL.     IMPRESSION: Left CN3 and 4 w/ mild 6th palsy possibly 2/2 recent surgical intervention of Left eye vs. less likely acute ischemic event.     RECOMMENDATIONS:  [] CT Head w/o contrast  [] CT Angio Head and Neck  [] MRI brain and orbits w/ and w/o contrast  [] Continue with home ASA 81mg QD  [] HbA1c and Lipid panel   [] Patch on affected eye for pt comfort  [] Neurochecks and vitals per unit protocol  [] Rest of care per primary team       Case to be seen and d/w Neurology attending physician during rounds.    72F PMHx HLD, T2DM, and glaucoma s/p Baerveldt implant (8/15/22) presenting from optho clinic after 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 Pt w/ recent h/o Left eye procedure and a few weeks post-procedure developed cranial nerve abnormalities. Vitals stable. On exam, pt w/ CN3 and 4 nerve palsy where pt's Left eye at baseline appears to be "down and out" w/ ptosis. Otherwise, non-focal. CBC and CMP WNL. CTH and CTA pending read.     IMPRESSION: Left CN3 and 4 w/ mild 6th palsy possibly 2/2 recent surgical intervention of Left eye vs. acute ischemic event. Also, consider inflammatory etiology.     RECOMMENDATIONS:  [] Follow-up CT Head w/o contrast official read  [] Follow-up CT Angio Head and Neck official read  [] MRI brain and orbits w/ and w/o contrast  [] Continue with home ASA 81mg QD  [] HbA1c and Lipid panel   [] Ach binding, blocking, modulating antibodies, MUSK antibody, LRP4 antibody, ACE, ESR, CRP  [] Neurochecks and vitals per unit protocol  [] Rest of care per primary team       Case seen and d/w Neurology attending physician during rounds.

## 2022-09-06 NOTE — ED ADULT NURSE NOTE - CAS EDN DISCHARGE ASSESSMENT
Alert and oriented to person, place and time Alert and oriented to person, place and time/Awake/Symptoms improved/Dressing clean and dry/No adverse reaction to first time med in ED

## 2022-09-06 NOTE — ED PROVIDER NOTE - NS ED ROS FT
CONSTITUTIONAL: No fevers, chills, fatigue, dizziness, weakness, unexpected weight change  EYES: + double vision, blurry vision  ENT: No ear pain, nasal congestion, runny nose, sore throat  CV: No chest pain, palpitations  PULM: No cough, shortness of breath  GI: No abdominal pain, nausea, vomiting, diarrhea, constipation  : No dysuria, polyuria, hematuria  SKIN: No rashes, swelling  MSK: No muscle aches  NEURO: No headache, paresthesias  PSYCHIATRIC: Denies suicidal, homicidal ideations. No auditory, visual, tactile hallucinations

## 2022-09-06 NOTE — ED CDU PROVIDER INITIAL DAY NOTE - OBJECTIVE STATEMENT
73 y/o female PMHx HLD, DMT2 s/p baerveldt implant to left eye on 8/15/22 now directed to the ED by optho concerned for left CN III/IV palsy. Patient reported for the last week she has had left eye droop and left eye double vision causing her to have unsteady gait. Patient denies eye pain, HA, CP, SOB, abdominal pain, n/v, slurred speech, numbness, weakness in extremities.   In ED, VSS. Labs nonactionable. CT/CTAs performed, no acute findings, and pt seen by neuro, recommending MRI Brain and orbits. ED team d/w pt's optho, sent patient in and no need for additional c/s in ED.

## 2022-09-06 NOTE — CONSULT NOTE ADULT - SUBJECTIVE AND OBJECTIVE BOX
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     Review of Systems:  INCOMPLETE   CONSTITUTIONAL: No fevers or chills  EYES AND ENT: No visual changes or no throat pain   NECK: No pain or stiffness  RESPIRATORY: No hemoptysis or shortness of breath  CARDIOVASCULAR: No chest pain or palpitations  GASTROINTESTINAL: No melena or hematochezia  GENITOURINARY: No dysuria or hematuria  NEUROLOGICAL: +As stated in HPI above  SKIN: No itching, burning, rashes, or lesions   All other review of systems is negative unless indicated above.    Allergies:      PMHx/PSHx/Family Hx: As above, otherwise see below   Diabetes        Social Hx:  Never smoker; no current use of tobacco, alcohol, or illicit drugs  Lives with ***; occupation ***, baseline functional status is ***    Medications:  MEDICATIONS  (STANDING):    MEDICATIONS  (PRN):      Vitals:  T(C): 36.7 (09-06-22 @ 13:20), Max: 36.7 (09-06-22 @ 13:20)  HR: 88 (09-06-22 @ 13:20) (88 - 88)  BP: 139/81 (09-06-22 @ 13:20) (139/81 - 139/81)  RR: 18 (09-06-22 @ 13:20) (18 - 18)  SpO2: 98% (09-06-22 @ 13:20) (98% - 98%)    Physical Examination: INCOMPLETE  General - non-toxic appearing male/female in no acute distress  Cardiovascular - peripheral pulses palpable, no edema  Respiratory - breathing comfortably with no increased work of breathing    Neurologic Exam:  Mental status - Awake, Alert, Oriented to person, place, and time. Speech fluent, repetition and naming intact. Follows simple and complex commands. Attention/concentration, recent and remote memory (including registration 3/3 and recall 3/3), and fund of knowledge intact    Cranial nerves - PERRLA, VFF, EOMI, face sensation (V1-V3) intact b/l, facial strength intact without asymmetry b/l, hearing intact b/l, palate with symmetric elevation, trapezius OR sternocleidomastiod 5/5 strength b/l, tongue midline on protrusion with full lateral movement    Motor - Normal bulk and tone throughout. No pronator drift.    Strength testing            Deltoid      Biceps      Triceps     Wrist Extension    Wrist Flexion     Interossei         R            5                 5               5                     5                              5                        5                 5  L             5                 5               5                     5                              5                        5                 5              Hip Flexion    Hip Extension    Knee Flexion    Knee Extension    Dorsiflexion    Plantar Flexion  R              5                         5                       5                           5                            5                          5  L              5                         5                        5                           5                            5                          5    Sensation - Light touch/temperature OR pain/vibration intact throughout    DTR's -             Biceps      Triceps     Brachioradialis      Patellar    Ankle    Toes/plantar response  R             2+             2+                  2+                       2+            2+                 Down  L              2+             2+                 2+                        2+           2+                 Down    Coordination - Finger to Nose intact b/l. No tremors appreciated    Gait and station - Normal casual gait. Romberg (-)    Labs:          CAPILLARY BLOOD GLUCOSE          Radiology:     Neurology - Consult Note    Spectra: [phone] (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 Pt states that on 8/15/22 she had a Baerveldt implant on the Left eye to control intraocular pressure. Procedure completed w/o any complications. Pt had follow-ups on the 8/16 and 8/25 and no abnormalities were noted at the time per optho notes. Approximately a week ago, pt began noticing her left eye "droop" and double vision. Pt states that it had been progressively worsening. In terms of double vision, pt states that she is able to see everything w/o double vision when closing the Left eye and only using the Right eye. When the left eye is only open, she notes double vision where objects are "on top of each other more than next to each other." Endorses mild pain associated with movement of Left eye. Denies swelling or purulent discharge from affected eye. Today, 9/6/22, pt had another follow-up with optho and cranial nerves involving the Left eye were noted to be abnormal which prompted pt to visit the ED to r/o stroke-like symptoms.     Review of Systems:  CONSTITUTIONAL: No fevers or chills  EYES AND ENT: No visual changes or no throat pain   NECK: No pain or stiffness  RESPIRATORY: No shortness of breath  CARDIOVASCULAR: No chest pain or palpitations  GASTROINTESTINAL: No nausea or vomiting   GENITOURINARY: No dysuria  NEUROLOGICAL: +As stated in HPI above  SKIN: No itching, burning, rashes, or lesions   All other review of systems is negative unless indicated above.    Allergies: NKDA    PMHx/PSHx/Family Hx: As above, otherwise see below   Diabetes    Social Hx:  Never smoker; no current use of tobacco, alcohol, or illicit drugs    Medications:  MEDICATIONS  (STANDING):    MEDICATIONS  (PRN):    Vitals:  T(C): 36.7 (09-06-22 @ 13:20), Max: 36.7 (09-06-22 @ 13:20)  HR: 88 (09-06-22 @ 13:20) (88 - 88)  BP: 139/81 (09-06-22 @ 13:20) (139/81 - 139/81)  RR: 18 (09-06-22 @ 13:20) (18 - 18)  SpO2: 98% (09-06-22 @ 13:20) (98% - 98%)    Physical Examination:  General - non-toxic appearing female in no acute distress  Cardiovascular - peripheral pulses palpable, no edema  Respiratory - breathing comfortably with no increased work of breathing    Neurologic Exam:  Mental status - Awake, Alert, Oriented to person, place, and time. Speech fluent, repetition and naming intact. Follows simple and complex commands. Attention/concentration and fund of knowledge intact    Cranial nerves - Pupil round and reactive to light (Left eye currently dilated after optho exam), +Left eye ptosis, visual fields grossly normal although pt has trouble seeing Left upper quadrant likely due to macular degeneration of R eye and not seem to be field cut deficit; primary gaze showing left eye deviated to down and left; upon Left gaze, pt unable to fully bury left sclera; vertical gaze intact but downward gaze limited on Left eye; upon Right gaze, Left eye not able to fully adduct although crosses midline (CN 3 and 4 palsy), face sensation (V1-V3) intact b/l, facial strength weaker on Left eye but symmetric face b/l, hearing intact b/l, palate with symmetric elevation, trapezius 5/5 strength b/l, tongue midline on protrusion with full lateral movement    Motor - Normal bulk and tone throughout. No pronator drift.    Strength testing            Deltoid      Biceps      Triceps     Wrist Extension    Wrist Flexion     Interossei         R            5                 5            5                 5                         5                        5                 5  L             5                 5           5                 5                         5                        5                 5              Hip Flexion    Hip Extension    Knee Flexion    Knee Extension    Dorsiflexion    Plantar Flexion  R              5                     5                       5                   5                            5                          5  L              5                     5                        5                   5                            5                          5    Sensation - Light touch intact throughout    DTR's -             Biceps      Triceps     Brachioradialis      Patellar    Ankle    Toes/plantar response  R             2+             2+                  2+                       2+            2+                 Down  L              2+             2+                 2+                        2+           2+                 Down    Coordination - Finger to Nose intact b/l. 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 Pt states that on 8/15/22 she had a Baerveldt implant on the Left eye to control intraocular pressure. Procedure completed w/o any complications. Pt had follow-ups on the 8/16 and 8/25 and no abnormalities were noted at the time per optho notes. Approximately a week ago, pt began noticing her left eye "droop" and double vision. Pt states that it had been progressively worsening. In terms of double vision, pt states that she is able to see everything w/o double vision when closing the Left eye and only using the Right eye. When the left eye is only open, she notes double vision where objects are "on top of each other more than next to each other." Endorses mild pain associated with movement of Left eye. Denies swelling or purulent discharge from affected eye. Today, 9/6/22, pt had another follow-up with optho and cranial nerves involving the Left eye were noted to be abnormal which prompted pt to visit the ED to r/o stroke-like symptoms.     Review of Systems:  CONSTITUTIONAL: No fevers or chills  EYES AND ENT: No visual changes or no throat pain   NECK: No pain or stiffness  RESPIRATORY: No shortness of breath  CARDIOVASCULAR: No chest pain or palpitations  GASTROINTESTINAL: No nausea or vomiting   GENITOURINARY: No dysuria  NEUROLOGICAL: +As stated in HPI above  SKIN: No itching, burning, rashes, or lesions   All other review of systems is negative unless indicated above.    Allergies: NKDA    PMHx/PSHx/Family Hx: As above, otherwise see below   Diabetes    Social Hx:  Never smoker; no current use of tobacco, alcohol, or illicit drugs    Medications:  MEDICATIONS  (STANDING):    MEDICATIONS  (PRN):    Vitals:  T(C): 36.7 (09-06-22 @ 13:20), Max: 36.7 (09-06-22 @ 13:20)  HR: 88 (09-06-22 @ 13:20) (88 - 88)  BP: 139/81 (09-06-22 @ 13:20) (139/81 - 139/81)  RR: 18 (09-06-22 @ 13:20) (18 - 18)  SpO2: 98% (09-06-22 @ 13:20) (98% - 98%)    Physical Examination:  General - non-toxic appearing female in no acute distress  Cardiovascular - peripheral pulses palpable, no edema  Respiratory - breathing comfortably with no increased work of breathing    Neurologic Exam:  Mental status - Awake, Alert, Oriented to person, place, and time. Speech fluent, repetition and naming intact. Follows simple and complex commands. Attention/concentration and fund of knowledge intact    Cranial nerves - Pupil round and reactive to light (Left eye currently dilated after optho exam), +Left eye ptosis, visual fields grossly normal although pt has trouble seeing Left upper quadrant likely due to macular degeneration of R eye and not seem to be field cut deficit; primary gaze showing left eye deviated to down and left; upon Left gaze, pt unable to fully bury left sclera; vertical gaze intact but downward gaze limited on Left eye; upon Right gaze, Left eye not able to fully adduct although crosses midline (CN 3 and 4 palsy), face sensation (V1-V3) intact b/l, facial strength weaker on Left eye but symmetric face b/l, hearing intact b/l, palate with symmetric elevation, trapezius 5/5 strength b/l, tongue midline on protrusion with full lateral movement    Motor - Normal bulk and tone throughout. No pronator drift.    Strength testing            Deltoid      Biceps      Triceps     Wrist Extension    Wrist Flexion     Interossei         R            5                 5            5                 5                         5                     5                 5  L             5                 5           5                 5                         5                     5                 5              Hip Flexion    Hip Extension    Knee Flexion    Knee Extension    Dorsiflexion    Plantar Flexion  R              5                     5                       5                   5                            5                          5  L              5                     5                        5                   5                            5                          5    Sensation - Light touch intact throughout    DTR's -             Biceps      Triceps     Brachioradialis      Patellar    Ankle    Toes/plantar response  R             2+             2+                  2+                       2+            2+                 Down  L              2+             2+                 2+                        2+           2+                 Down    Coordination - Finger to Nose intact b/l. No tremors appreciated    Gait and station - did not assess at the time of interview due to visual change    Labs:                        13.9   7.76  )-----------( 354      ( 06 Sep 2022 14:19 )             45.7     09-06    142  |  104  |  12  ----------------------------<  161<H>  4.2   |  23  |  0.72    Ca    9.7      06 Sep 2022 14:19    TPro  7.4  /  Alb  4.6  /  TBili  0.6  /  DBili  x   /  AST  20  /  ALT  16  /  AlkPhos  85  09-06    CAPILLARY BLOOD GLUCOSE    Radiology:     Neurology - Consult Note    Spectra: [phone] ([facility]), [phone] ([facility])    HPI:  72F PMHx HLD, T2DM, and glaucoma s/p Baerveldt implant (8/15/22) presenting from optho clinic after 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 Pt states that on 8/15/22 she had a Baerveldt implant on the Left eye to control intraocular pressure. Procedure completed w/o any complications. Pt had follow-ups on the 8/16 and 8/25 and no abnormalities were noted at the time per optho notes. Approximately a week ago, pt began noticing her left eye "droop" and double vision. Pt states that it had been progressively worsening. In terms of double vision, pt states that she is able to see everything w/o double vision when closing the Left eye and only using the Right eye. When the left eye is only open, she notes double vision where objects are "on top of each other more than next to each other." Endorses mild pain associated with movement of Left eye. Denies swelling or purulent discharge from affected eye. Today, 9/6/22, pt had another follow-up with optho and cranial nerves involving the Left eye were noted to be abnormal which prompted pt to visit the ED to r/o stroke-like symptoms.     Review of Systems:  CONSTITUTIONAL: No fevers or chills  EYES AND ENT: No visual changes or no throat pain   NECK: No pain or stiffness  RESPIRATORY: No shortness of breath  CARDIOVASCULAR: No chest pain or palpitations  GASTROINTESTINAL: No nausea or vomiting   GENITOURINARY: No dysuria  NEUROLOGICAL: +As stated in HPI above  SKIN: No itching, burning, rashes, or lesions   All other review of systems is negative unless indicated above.    Allergies: NKDA    PMHx/PSHx/Family Hx: As above, otherwise see below   Diabetes    Social Hx:  Never smoker; no current use of tobacco, alcohol, or illicit drugs    Medications:  MEDICATIONS  (STANDING):    MEDICATIONS  (PRN):    Vitals:  T(C): 36.7 (09-06-22 @ 13:20), Max: 36.7 (09-06-22 @ 13:20)  HR: 88 (09-06-22 @ 13:20) (88 - 88)  BP: 139/81 (09-06-22 @ 13:20) (139/81 - 139/81)  RR: 18 (09-06-22 @ 13:20) (18 - 18)  SpO2: 98% (09-06-22 @ 13:20) (98% - 98%)    Physical Examination:  General - non-toxic appearing female in no acute distress  Cardiovascular - peripheral pulses palpable, no edema  Respiratory - breathing comfortably with no increased work of breathing    Neurologic Exam:  Mental status - Awake, Alert, Oriented to person, place, and time. Speech fluent, repetition and naming intact. Follows simple and complex commands. Attention/concentration and fund of knowledge intact    Cranial nerves - Pupil round and reactive to light (Left eye currently dilated after optho exam), +Left eye ptosis, visual fields grossly normal although pt has trouble seeing Left upper quadrant likely due to macular degeneration of R eye and not seem to be field cut deficit; primary gaze showing left eye deviated to down and left; upon Left gaze, pt unable to fully bury left sclera; vertical gaze intact but downward gaze limited on Left eye; upon Right gaze, Left eye not able to fully adduct although crosses midline (CN 3 and 4 palsy), face sensation (V1-V3) intact b/l, facial strength weaker on Left eye but symmetric face b/l, hearing intact b/l, palate with symmetric elevation, trapezius 5/5 strength b/l, tongue midline on protrusion with full lateral movement    Motor - Normal bulk and tone throughout. No pronator drift.    Strength testing            Deltoid      Biceps      Triceps     Wrist Extension    Wrist Flexion     Interossei         R            5                 5            5                 5                         5                     5                 5  L             5                 5           5                 5                         5                     5                 5              Hip Flexion    Hip Extension    Knee Flexion    Knee Extension    Dorsiflexion    Plantar Flexion  R              5                     5                       5                   5                            5                          5  L              5                     5                        5                   5                            5                          5    Sensation - Light touch intact throughout    DTR's -             Biceps      Triceps     Brachioradialis      Patellar    Ankle    Toes/plantar response  R             2+             2+                  2+               1+         1+                 Down  L              2+             2+                 2+                1+         1+                 Down    Coordination - Finger to Nose intact b/l. No tremors appreciated    Gait and station - did not assess at the time of interview due to visual change    Labs:                        13.9   7.76  )-----------( 354      ( 06 Sep 2022 14:19 )             45.7     09-06    142  |  104  |  12  ----------------------------<  161<H>  4.2   |  23  |  0.72    Ca    9.7      06 Sep 2022 14:19    TPro  7.4  /  Alb  4.6  /  TBili  0.6  /  DBili  x   /  AST  20  /  ALT  16  /  AlkPhos  85  09-06    CAPILLARY BLOOD GLUCOSE    Radiology:     Neurology - Consult Note    Spectra: [phone] ([facility]), [phone] ([facility])    HPI:  72F PMHx HLD, T2DM, and glaucoma s/p Baerveldt implant (8/15/22) presenting from optho clinic after 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 Pt states that on 8/15/22 she had a Baerveldt implant on the Left eye to control intraocular pressure. Procedure completed w/o any complications. Pt had follow-ups on the 8/16 and 8/25 and no abnormalities were noted at the time per optho notes. Approximately a week ago, pt began noticing her left eye "droop" and double vision. Pt states that it had been progressively worsening. In terms of double vision, pt states that she is able to see everything w/o double vision when closing the Left eye and only using the Right eye. When the left eye is only open, she notes double vision where objects are "on top of each other more than next to each other." Endorses mild pain associated with movement of Left eye. Denies swelling or purulent discharge from affected eye. Today, 9/6/22, pt had another follow-up with optho and cranial nerves involving the Left eye were noted to be abnormal which prompted pt to visit the ED to r/o stroke-like symptoms.     Review of Systems:  CONSTITUTIONAL: No fevers or chills  EYES AND ENT: No visual changes or no throat pain   NECK: No pain or stiffness  RESPIRATORY: No shortness of breath  CARDIOVASCULAR: No chest pain or palpitations  GASTROINTESTINAL: No nausea or vomiting   GENITOURINARY: No dysuria  NEUROLOGICAL: +As stated in HPI above  SKIN: No itching, burning, rashes, or lesions   All other review of systems is negative unless indicated above.    Allergies: NKDA    PMHx/PSHx/Family Hx: As above, otherwise see below   Diabetes    Social Hx:  Never smoker; no current use of tobacco, alcohol, or illicit drugs    Medications:  MEDICATIONS  (STANDING):    MEDICATIONS  (PRN):    Vitals:  T(C): 36.7 (09-06-22 @ 13:20), Max: 36.7 (09-06-22 @ 13:20)  HR: 88 (09-06-22 @ 13:20) (88 - 88)  BP: 139/81 (09-06-22 @ 13:20) (139/81 - 139/81)  RR: 18 (09-06-22 @ 13:20) (18 - 18)  SpO2: 98% (09-06-22 @ 13:20) (98% - 98%)    Physical Examination:  General - non-toxic appearing female in no acute distress  Cardiovascular - peripheral pulses palpable, no edema  Respiratory - breathing comfortably with no increased work of breathing  Eyes - Fundoscopy with sharp, flat optic discs and no hemorrhage or exudates    Neurologic Exam:  Mental status - Awake, Alert, Oriented to person, place, and time. Speech fluent, repetition and naming intact. Follows simple and complex commands. Attention/concentration, recent and remote memory, and fund of knowledge intact    Cranial nerves - Pupil round and reactive to light (Left eye currently dilated after optho exam), +Left eye ptosis, visual fields grossly normal although pt has trouble seeing Left upper quadrant likely due to macular degeneration of R eye and not seem to be field cut deficit; primary gaze showing left eye deviated to down and left; upon Left gaze, pt unable to fully bury left sclera; vertical gaze intact but downward gaze limited on Left eye; upon Right gaze, Left eye not able to fully adduct although crosses midline (CN 3 and 4 palsy), face sensation (V1-V3) intact b/l, facial strength weaker on Left eye but symmetric face b/l, hearing intact b/l, palate with symmetric elevation, trapezius 5/5 strength b/l, tongue midline on protrusion with full lateral movement    Motor - Normal bulk and tone throughout. No pronator drift.    Strength testing            Deltoid      Biceps      Triceps     Wrist Extension    Wrist Flexion     Interossei         R            5                 5            5                 5                         5                     5                 5  L             5                 5           5                 5                         5                     5                 5              Hip Flexion    Hip Extension    Knee Flexion    Knee Extension    Dorsiflexion    Plantar Flexion  R              5                     5                       5                   5                            5                          5  L              5                     5                        5                   5                            5                          5    Sensation - Light touch intact throughout    DTR's -             Biceps      Triceps     Brachioradialis      Patellar    Ankle    Toes/plantar response  R             2+             2+                  2+               1+         1+                 Down  L              2+             2+                 2+                1+         1+                 Down    Coordination - Finger to Nose intact b/l. No tremors appreciated    Gait and station - did not assess at the time of interview due to visual change and fall risk/safety concerns    Labs:                        13.9   7.76  )-----------( 354      ( 06 Sep 2022 14:19 )             45.7     09-06    142  |  104  |  12  ----------------------------<  161<H>  4.2   |  23  |  0.72    Ca    9.7      06 Sep 2022 14:19    TPro  7.4  /  Alb  4.6  /  TBili  0.6  /  DBili  x   /  AST  20  /  ALT  16  /  AlkPhos  85  09-06    CAPILLARY BLOOD GLUCOSE    Radiology:

## 2022-09-06 NOTE — ED CDU PROVIDER INITIAL DAY NOTE - DETAILS
R/O CVA  -TELE  -NEURO CHECKS  -MRI Brain and Orbits  -LETA EVALS  -NEURO FOLLOWING  -CASE D/W ATTENDING

## 2022-09-06 NOTE — ED PROVIDER NOTE - OBJECTIVE STATEMENT
73 y/o female PMHx HTN, DM s/p baeverldt implant to left eye on 8/15/22 now directed to the ED by optho concern for left CN III/IV palsy. Patient reported for the last week she has had left eye droop and left eye double vision causing her to have unsteady gait. Patient denied CP, SOB, abdominal pain, slurred speech, facial droop, numbness, weakness in extremity

## 2022-09-06 NOTE — ED ADULT NURSE NOTE - OBJECTIVE STATEMENT
Pt presents to the ED c/o double vision. PMANT BORDEN. Pt presents to the ED c/o vision changes. PMH DM, glaucoma. As per pt her optho MD sent her in to check for neuro exam and scan. As per pt she has L eye surgery on 8/15 for ICP and had an implant placed since the surgery her vision has not been the same, left eye is droopy as per pt unknown time and she feels like she can't fully open her left eye for weeks now on her last Optho visit on 8/25 her ophthalmologist did not noticed the her droopy eye. Pt is AOx4. breathing unlabored symmetrical rise and fall of the chest. Abdomen is soft and nondistended. Skin is warm and dry to touch. Pt denies any CP, SOB, fever, chills, N/V, urinary symptoms. On stretcher at lowest position. Family member at bedside.

## 2022-09-06 NOTE — ED CDU PROVIDER DISPOSITION NOTE - PATIENT PORTAL LINK FT
You can access the FollowMyHealth Patient Portal offered by [facility] by registering at the following website: [email or web address]. By joining [facility] FollowMyHealth portal, you will also be able to view your health information using other applications (apps) compatible with our system.

## 2022-09-06 NOTE — ED CDU PROVIDER INITIAL DAY NOTE - PROGRESS NOTE DETAILS
CDU PROGRESS NOTE LIUDMILA SHAH: Pt currently at MRI. CDU PROGRESS NOTE LIUDMILA SHAH: Received pt at 1900 sign-out. Case/plan reviewed. VSS. +Left eye ptosis, visual fields grossly normal although pt has trouble seeing Left upper quadrant. Primary gaze showing left eye deviated to down and left; upon Left gaze, pt unable to fully bury left sclera; vertical gaze intact but downward gaze limited on Left eye; Left eye not able to fully adduct although crosses midline (CN 3 and 4 palsy). Will continue to monitor, MRI tonight.

## 2022-09-06 NOTE — CONSULT NOTE ADULT - ATTENDING COMMENTS
HPI as per resident note, personally verified by me. Patient with Baerveldt implant in L eye for glaucoma ~3 weeks ago. She then had binocular horizontal and vertical diplopia 1 week ago as well as ptosis. R eye without symptoms and she denies any facial weakness, dysphagia, respiratory difficulty, or extremity weakness. She went to see her ophthalmologist and had a normal exam other than CN III and IV palsy so she was sent to the ED for additional neurologic work-up. No additional focal neurologic deficits reported.    Neurologic exam as per resident note with additions as below:  AAO x3, speech fluent  CN's with anisocoria (L eye larger than R eye) and mildly reactive b/l (had eyes dilated for ophthalmologic fundoscopic exam), L CN III and L CN VI as well as mild L CN VI weakness, L eye ptosis noted, R eye normal, VFF, otherwise II-XII intact  Strength 5/5 all  Sens intact all  Downgoing b/l plantar response  FtN intact b/l    ESR, CRP WNL  A1C inc 7.7%    MRI brain and orbits w/ and w/o 9/6 - Status post bilateral lens replacement and left-sided Baerveldt implant.   There is a fluid collection surrounding the Baerveldt  implant,   concerning for malfunction. There is approximately 4.3 mm of fluid   superior to the implant disc and approximately 4.7 mm of fluid inferior   to the implant disc. The collection contacts the left superior rectus and   lateral rectus muscles.  Clinical correlation by ophthalmologist is   needed.    No acute intracranial hemorrhage or infarction. Scattered FLAIR   hyperintense white matter foci in the periventricular white matter,   compatible with mild to moderate chronic small vessel changes.    A/P:  L eye ophthalmoplegia and ptosis  Glaucoma  DM type 2    - Etiology for symptoms is unclear and includes cerebrovascular, inflammatory, ocular, neuromuscular junction disorder, toxic/metabolic (DM?), or neoplastic. Ophthalmology do not feel it is related to Baerveldt implant. She has no fatigable weakness on exam, no other weakness, and no sensory changes. No respiratory distress  - MRI brain and orbits w/ and w/o with results of above. No evidence of stroke, inflammatory changes, or neoplasm. Given fluid collection contacting the extraocular muscular would follow-up with ophthalmology to determine if this or other ocular pathology could be contributing to her symptoms  - Check labs for additional causes with Ach rec Ab's (binding, blocking, modulating), anti-MuSK Ab's, LRP4 Ab's, ACE; can follow-up results as outpatient  - If ophthalmology does not feel additional work-up or intervention is needed then there would be no further acute inpatient neurologic work-up indicated at this time. She can follow-up with outpatient neurologist Dr. Tiburcio Ruiz ([phone]) for additional evaluation and testing  - Continue to address above medical problems, as you are doing  - Will continue to follow patient with you, as needed

## 2022-09-06 NOTE — ED CDU PROVIDER DISPOSITION NOTE - NSFOLLOWUPINSTRUCTIONS_ED_ALL_ED_FT
(1) You will need to follow up with your PMD and our recommended neurologist (____) in 2-3 days for your _____. A copy of your results were given to you to bring to your appt.  (2) Continue your home medications as directed.  (3) Drink plenty of fluids to stay hydrated.  (4) Read attached discharge paperwork.  (5) Return to ER for headache, confusion, behavior/speech changes, numbness/tingling/weakness in your arms/legs, or any other concerns. 1. stay hydrated.  2. continue current home medications, monitor sugars, eat healthy.   3. Follow up with your PCP in 1-2 days.  4. Follow up with Neurology #[phone] in next week.   Follow up with your Ophthalmologist in 2-3 days.   you can also follow up with Neuro-ophthalmologist in 3-4 days.  Ophthalmology  [address], [address]  [place] [address]  Phone: [phone]  Fax: [phone]    5. Bring Printed Results to your Doctor Visits. Stroke Education given to you.  6. Return if symptoms worsen, fever, weakness, numbness, dizziness, vision change, slurred speech and all other concerns    **follow up the following with your doctors:  elevated hgb a1c 7.7          Stroke Prevention      Some medical conditions and behaviors can lead to a higher chance of having a stroke. You can help prevent a stroke by eating healthy, exercising, not smoking, and managing any medical conditions you have.    Stroke is a leading cause of functional impairment. Primary prevention is particularly important because a majority of strokes are first-time events. Stroke changes the lives of not only those who experience a stroke but also their family and other caregivers.      How can this condition affect me?    A stroke is a medical emergency and should be treated right away. A stroke can lead to brain damage and can sometimes be life-threatening. If a person gets medical treatment right away, there is a better chance of surviving and recovering from a stroke.      What can increase my risk?    The following medical conditions may increase your risk of a stroke:  •Cardiovascular disease.      •High blood pressure (hypertension).      •Diabetes.      •High cholesterol.      •Sickle cell disease.      •Blood clotting disorders (hypercoagulable state).      •Obesity.      •Sleep disorders (obstructive sleep apnea).      Other risk factors include:  •Being older than age 60.      •Having a history of blood clots, stroke, or mini-stroke (transient ischemic attack, TIA).      •Genetic factors, such as race, ethnicity, or a family history of stroke.      •Smoking cigarettes or using other tobacco products.      •Taking birth control pills, especially if you also use tobacco.      •Heavy use of alcohol or drugs, especially cocaine and methamphetamine.      •Physical inactivity.        What actions can I take to prevent this?    Manage your health conditions   •High cholesterol levels.  •Eating a healthy diet is important for preventing high cholesterol. If cholesterol cannot be managed through diet alone, you may need to take medicines.      •Take any prescribed medicines to control your cholesterol as told by your health care provider.      •Hypertension.  •To reduce your risk of stroke, try to keep your blood pressure below 130/80.      •Eating a healthy diet and exercising regularly are important for controlling blood pressure. If these steps are not enough to manage your blood pressure, you may need to take medicines.      •Take any prescribed medicines to control hypertension as told by your health care provider.      •Ask your health care provider if you should monitor your blood pressure at home.      •Have your blood pressure checked every year, even if your blood pressure is normal. Blood pressure increases with age and some medical conditions.      •Diabetes.  •Eating a healthy diet and exercising regularly are important parts of managing your blood sugar (glucose). If your blood sugar cannot be managed through diet and exercise, you may need to take medicines.      •Take any prescribed medicines to control your diabetes as told by your health care provider.        •Get evaluated for obstructive sleep apnea. Talk to your health care provider about getting a sleep evaluation if you snore a lot or have excessive sleepiness.      •Make sure that any other medical conditions you have, such as atrial fibrillation or atherosclerosis, are managed.        Nutrition      Follow instructions from your health care provider about what to eat or drink to help manage your health condition. These instructions may include:  •Reducing your daily calorie intake.      •Limiting how much salt (sodium) you use to 1,500 milligrams (mg) each day.      •Using only healthy fats for cooking, such as olive oil, canola oil, or sunflower oil.    •Eating healthy foods. You can do this by:  •Choosing foods that are high in fiber, such as whole grains, and fresh fruits and vegetables.      •Eating at least 5 servings of fruits and vegetables a day. Try to fill one-half of your plate with fruits and vegetables at each meal.      •Choosing lean protein foods, such as lean cuts of meat, poultry without skin, fish, tofu, beans, and nuts.      •Eating low-fat dairy products.        •Avoiding foods that are high in sodium. This can help lower blood pressure.      •Avoiding foods that have saturated fat, trans fat, and cholesterol. This can help prevent high cholesterol.      •Avoiding processed and prepared foods.      •Counting your daily carbohydrate intake.      Lifestyle   •If you drink alcohol:•Limit how much you have to:  •0–1 drink a day for women who are not pregnant.      •0–2 drinks a day for men.        •Know how much alcohol is in your drink. In the [place] one drink equals one 12 oz bottle of beer (355mL), one 5 oz glass of wine (148mL), or one 1½ oz glass of hard liquor (44mL).        • Do not use any products that contain nicotine or tobacco. These products include cigarettes, chewing tobacco, and vaping devices, such as e-cigarettes. If you need help quitting, ask your health care provider.      •Avoid secondhand smoke.      • Do not use drugs.        Activity      •Try to stay at a healthy weight.    •Get at least 30 minutes of exercise on most days, such as:  •Fast walking.      •Biking.      •Swimming.        Medicines     •Take over-the-counter and prescription medicines only as told by your health care provider. Aspirin or blood thinners (antiplatelets or anticoagulants) may be recommended to reduce your risk of forming blood clots that can lead to stroke.    •Avoid taking birth control pills. Talk to your health care provider about the risks of taking birth control pills if:  •You are over 35 years old.      •You smoke.      •You get very bad headaches.      •You have had a blood clot.          Where to find more information    •American Stroke Association: www.strokeassociation.org        Get help right away if:  •You or a loved one has any symptoms of a stroke. "BE FAST" is an easy way to remember the main warning signs of a stroke:  •B - Balance. Signs are dizziness, sudden trouble walking, or loss of balance.      •E - Eyes. Signs are trouble seeing or a sudden change in vision.      •F - Face. Signs are sudden weakness or numbness of the face, or the face or eyelid drooping on one side.      •A - Arms. Signs are weakness or numbness in an arm. This happens suddenly and usually on one side of the body.      •S - Speech. Signs are sudden trouble speaking, slurred speech, or trouble understanding what people say.      •T - Time. Time to call emergency services. Write down what time symptoms started.      •You or a loved one has other signs of a stroke, such as:  •A sudden, severe headache with no known cause.      •Nausea or vomiting.      •Seizure.        These symptoms may represent a serious problem that is an emergency. Do not wait to see if the symptoms will go away. Get medical help right away. Call your local emergency services (911 in the [place] Do not drive yourself to the hospital.       Summary    •You can help to prevent a stroke by eating healthy, exercising, not smoking, limiting alcohol intake, and managing any medical conditions you may have.      • Do not use any products that contain nicotine or tobacco. These include cigarettes, chewing tobacco, and vaping devices, such as e-cigarettes. If you need help quitting, ask your health care provider.      •Remember "BE FAST" for warning signs of a stroke. Get help right away if you or a loved one has any of these signs.      This information is not intended to replace advice given to you by your health care provider. Make sure you discuss any questions you have with your health care provider.      Document Revised: 07/19/2021 Document Reviewed: 07/19/2021    [employer] Patient Education © 2022 [employer]           Blurred Vision    WHAT YOU NEED TO KNOW:    Blurred vision is when you cannot see fine details. You may have blurred vision if you are nearsighted or farsighted and you need glasses. Blurred vision may be caused by a corneal abrasion (scratch on the cornea) or a corneal ulcer (open sore). You may have blurred vision if your eye came into contact with a chemical. A foreign body or infection may also cause blurred vision. Medical conditions, such as cataracts, glaucoma, detached retina, and nerve disorders can also cause blurred vision. Blurred vision may also be caused by a concussion or a tumor. If you have diabetes, you may develop diabetic retinopathy. Diabetic retinopathy damages the blood vessels of your retina.   Eye Anatomy         DISCHARGE INSTRUCTIONS:    Return to the emergency department if:   •You have weakness in an arm or leg, difficulty speaking or seeing, and a severe headache.      •You have a fever, eye pain, or discharge.       •You have a sudden loss of vision.       Contact your healthcare provider if:   •Your blurred vision gets worse.      •Your blurred vision is worse in the morning.      •You have a sudden headache or eye pain.      •Your eye has swelling, redness, or discharge.      •You see floaters, flashes of light, fine dots, or cobweb shapes.      •You have questions or concerns about your condition or care.       Medicines: You may need any of the following:   •Prescription pain medicine may be given. Ask how to take this medicine safely.      •Antibiotics help prevent or treat an eye infection caused by bacteria. It may be given as eyedrops or an ointment.      •Take your medicine as directed. Contact your healthcare provider if you think your medicine is not helping or if you have side effects. Tell your provider if you are allergic to any medicine. Keep a list of the medicines, vitamins, and herbs you take. Include the amounts, and when and why you take them. Bring the list or the pill bottles to follow-up visits. Carry your medicine list with you in case of an emergency.      Manage your blurred vision: Your healthcare provider may ask you to do any of the following:  •Use artificial tears to keep your eye moist or to soothe your irritated eye.      •Apply a cool compress to decrease any swelling or pain. Wet a clean washcloth with cool water and place it on your eye. Use the cool compress as often as directed.       •Wear an eye patch as directed to protect your eye.  Eye Patch           Follow up with your healthcare provider as directed: You may need other eye exams and medicines. Write down your questions so you remember to ask them during your visits.       © Copyright [employer] 2022           back to top                          © Copyright [employer] 2022

## 2022-09-06 NOTE — ED CDU PROVIDER INITIAL DAY NOTE - MEDICAL DECISION MAKING DETAILS
Preeti (MD) - 73 yo F hx of glaucoma, HTN, DM, recent procedure to left eye presenting from ophtho clinic for concern for multiple cranial nerve palsies. States last week she noted left eye droop and left eye double vision causing her to have unsteady gait. States this is new for her. Son at bedside states pts left eye appears lazy. Ophthalmologist sent pt in to r/o stroke or mass or aneurysm causing CN palsies.   On exam vitals unremarkable. Pt has normal strength in all extremities. CN exam significant for CN 3 palsy and possible CN 4 palsy as well. CT/CTA negativ for acute findings. Pt seen by neurology who recommend MRI so pt to be placed in CDU for MRI brain/orbits. Ophtho called and states pt alrady seen in clinic, no need for ED consult and optho signing off.

## 2022-09-06 NOTE — ED CLERICAL - BED REQUESTED
06-Sep-2022 18:43 06-Sep-2022 18:46 Patient requests all Lab and Radiology Results on their Discharge Instructions

## 2022-09-06 NOTE — ED CDU PROVIDER DISPOSITION NOTE - CLINICAL COURSE
71 y/o female PMHx HLD, DMT2 s/p baerveldt implant to left eye on 8/15/22 now directed to the ED by optho concerned for left CN III/IV palsy. Patient reported for the last week she has had left eye droop and left eye double vision causing her to have unsteady gait. Patient denies eye pain, HA, CP, SOB, abdominal pain, n/v, slurred speech, numbness, weakness in extremities.   In ED, VSS. Labs nonactionable. CT/CTAs performed, no acute findings, and pt seen by neuro, recommending MRI Brain and orbits. ED team d/w pt's optho, sent patient in and no need for additional c/s in ED.

## 2022-09-07 VITALS
DIASTOLIC BLOOD PRESSURE: 80 MMHG | HEART RATE: 80 BPM | OXYGEN SATURATION: 98 % | RESPIRATION RATE: 18 BRPM | TEMPERATURE: 98 F | SYSTOLIC BLOOD PRESSURE: 130 MMHG

## 2022-09-07 LAB
A1C WITH ESTIMATED AVERAGE GLUCOSE RESULT: 7.7 % — HIGH (ref 4–5.6)
CHOLEST SERPL-MCNC: 149 MG/DL — SIGNIFICANT CHANGE UP
ERYTHROCYTE [SEDIMENTATION RATE] IN BLOOD: 12 MM/HR — SIGNIFICANT CHANGE UP (ref 0–20)
ESTIMATED AVERAGE GLUCOSE: 174 MG/DL — HIGH (ref 68–114)
GLUCOSE BLDC GLUCOMTR-MCNC: 126 MG/DL — HIGH (ref 70–99)
GLUCOSE BLDC GLUCOMTR-MCNC: 127 MG/DL — HIGH (ref 70–99)
HDLC SERPL-MCNC: 63 MG/DL — SIGNIFICANT CHANGE UP
LIPID PNL WITH DIRECT LDL SERPL: 72 MG/DL — SIGNIFICANT CHANGE UP
NON HDL CHOLESTEROL: 86 MG/DL — SIGNIFICANT CHANGE UP
TRIGL SERPL-MCNC: 68 MG/DL — SIGNIFICANT CHANGE UP

## 2022-09-07 PROCEDURE — 82164 ANGIOTENSIN I ENZYME TEST: CPT

## 2022-09-07 PROCEDURE — 86140 C-REACTIVE PROTEIN: CPT

## 2022-09-07 PROCEDURE — 99217: CPT

## 2022-09-07 PROCEDURE — 99285 EMERGENCY DEPT VISIT HI MDM: CPT | Mod: 25

## 2022-09-07 PROCEDURE — 83036 HEMOGLOBIN GLYCOSYLATED A1C: CPT

## 2022-09-07 PROCEDURE — 86255 FLUORESCENT ANTIBODY SCREEN: CPT

## 2022-09-07 PROCEDURE — 93005 ELECTROCARDIOGRAM TRACING: CPT

## 2022-09-07 PROCEDURE — 80053 COMPREHEN METABOLIC PANEL: CPT

## 2022-09-07 PROCEDURE — 70543 MRI ORBT/FAC/NCK W/O &W/DYE: CPT | Mod: MA

## 2022-09-07 PROCEDURE — 70481 CT ORBIT/EAR/FOSSA W/DYE: CPT | Mod: MA

## 2022-09-07 PROCEDURE — 70553 MRI BRAIN STEM W/O & W/DYE: CPT | Mod: MA

## 2022-09-07 PROCEDURE — 36415 COLL VENOUS BLD VENIPUNCTURE: CPT

## 2022-09-07 PROCEDURE — G0378: CPT

## 2022-09-07 PROCEDURE — 86042 ACETYLCHOLN RCPTR BLCKG ANTB: CPT

## 2022-09-07 PROCEDURE — 85025 COMPLETE CBC W/AUTO DIFF WBC: CPT

## 2022-09-07 PROCEDURE — 70496 CT ANGIOGRAPHY HEAD: CPT | Mod: MA

## 2022-09-07 PROCEDURE — A9585: CPT

## 2022-09-07 PROCEDURE — 70498 CT ANGIOGRAPHY NECK: CPT | Mod: MA

## 2022-09-07 PROCEDURE — 82962 GLUCOSE BLOOD TEST: CPT

## 2022-09-07 PROCEDURE — 70450 CT HEAD/BRAIN W/O DYE: CPT | Mod: MA

## 2022-09-07 PROCEDURE — 86043 ACETYLCHOLN RCPTR MODLG ANTB: CPT

## 2022-09-07 PROCEDURE — 85652 RBC SED RATE AUTOMATED: CPT

## 2022-09-07 PROCEDURE — 86041 ACETYLCHOLN RCPTR BNDNG ANTB: CPT

## 2022-09-07 PROCEDURE — 86366 MUSCLE-SPECIFIC KINASE ANTB: CPT

## 2022-09-07 PROCEDURE — 87637 SARSCOV2&INF A&B&RSV AMP PRB: CPT

## 2022-09-07 PROCEDURE — 80061 LIPID PANEL: CPT

## 2022-09-07 RX ORDER — ACETAMINOPHEN 500 MG
975 TABLET ORAL ONCE
Refills: 0 | Status: COMPLETED | OUTPATIENT
Start: 2022-09-07 | End: 2022-09-07

## 2022-09-07 RX ADMIN — Medication 975 MILLIGRAM(S): at 14:30

## 2022-09-07 NOTE — ED CDU PROVIDER SUBSEQUENT DAY NOTE - ATTENDING APP SHARED VISIT CONTRIBUTION OF CARE
MD Mo:  I have personally performed a face to face diagnostic evaluation on this patient with the PA.  I have reviewed the ACP note and agree with the history, exam, and plan of care, except as noted.  History and Exam by me shows a 73 y/o female, sent to the CDU for evaluation of L CNIII palsy.    Context:  onset of symptoms was < 24 hrs after baerveldt implant to left eye on 8/15/22.   Finally took off her eye patch and noticed the L eye droopiness and double vision.  She then went to see Dr. Berman in clinic yesterday, and was directed to the ED for evaluation of left CN III/IV palsy.  Per our conversations with Ophthalmology, they do not feel that this is a complication of the procedure.  Patient is a diabetic.     ED workup thus far has been negative for CVA; CT/CTA showed age-appropriate changes; MRI brain pending.    VS: wnl.  Face/eyes:  L eye with ptosis and deviated up and out.  No other facial droop appreciated; no slurred speech.  No ataxia.  strength 5/5 throughout.    Impression:  L CN III palsy, either due to DM or other cause.    Plan:  will discuss further recommendations with Ophtho team re: possibility that this is complication of procedure or not.  Seen by Neuro attending in the ED yesterday, who feel that this is a L 3rd nerve palsy. MD Mo:  I have personally performed a face to face diagnostic evaluation on this patient with the PA.  I have reviewed the ACP note and agree with the history, exam, and plan of care, except as noted.  History and Exam by me shows a 71 y/o female, sent to the CDU for evaluation of L CNIII palsy.    Context:  onset of symptoms was in proximity to baerveldt implant to left eye on 8/15/22.   Endorses that she took off her eye patch and noticed the L eye droopiness and double vision.  She then went to see Dr. Berman in clinic yesterday, and was directed to the ED for evaluation of left CN III/IV palsy.  Per our conversations with Ophthalmology, they do not feel that this is a complication of the procedure.  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  Patient is a diabetic.     ED workup thus far has been negative for CVA; CT/CTA showed age-appropriate changes; MRI brain pending.    VS: wnl.  Face/eyes:  L eye with ptosis and deviated up and out.  No other facial droop appreciated; no slurred speech.  No ataxia.  strength 5/5 throughout.    Impression:  L CN III palsy, either due to DM or other cause.    Plan:  will discuss further recommendations with Ophtho team re: possibility that this is complication of procedure or not.  Seen by Neuro attending in the ED yesterday, who feel that this is a L 3rd nerve palsy.  MRI shows normal brain Case discussed directly with Dr. Berman, who feels that the baerveldt implant is functioning properly, and that the MRI confirms a diagnosis of CN 3 and likely 4th nerve palsies, most likely due to microvascular disease. and that there is no need for further Ophtho evaluation/intervention at this time.  She may be safely dc'd to f/u with Ophtho as an outpatient.  Return precautions.

## 2022-09-07 NOTE — ED CDU PROVIDER SUBSEQUENT DAY NOTE - PROGRESS NOTE DETAILS
CDU NOTE LIUDMILA Chinchilla: pt resting comfortably, still with L eye blurred vision. no new complaints. NAD VSS. no events on tele. neuro- L eye CNIII palsy - L eye deviated down and Left. +L eye ptosis. no other CN deficits noted on exam. m/s intact x 4. CDU NOTE LIUDMILA Chinchilla: pt resting comfortably, feels about the same unchanged. no new symptoms. had mild headache- given tylenol. no other complaints/concerns. NAD VSS. no events on tele.  spoke with Neuro - as per their attending Dr. Albarado, ok to d/c from neuro standpoint, pt to f/up outpt  pt spoke with her Ophtho surgeon on the phone, Dr. Lainez, who Dr. Mo spoke with and reviewed MRI results with. Ophtho surgeon reviewed results with patient as well and all questions/concerns addressed. as per Ophtho, the MRI results are appropriate s/p surgery/implant, findings are consistent, not malfunctioning and not cause for current CNIII palsy.   as per Dr. Mo, pt stable for d/c home to f/up with ophtho and neuro

## 2022-09-07 NOTE — ED ADULT NURSE REASSESSMENT NOTE - NS ED NURSE REASSESS COMMENT FT1
17.00 Pt is evaluated by CDU MD Santos Robertson . pt is feeling better.  Pt is discharged . Ml out  by LIUDMILA Chinchilla  explained the follow up care & gave the discharge summary  . Pt has stable vitals steady gait A&OX 4 at the time of Discharge
1845 Received the Pt from  SYDNEY Mccoy  Pt is Observed for Left eye Double visio for MRI . Received the Pt A&OX 4 obeys commands Vero N/V/D fever chills cp SOB   Comfort care & safety measures continued  IV site looks clean & dry no signs of infiltration noted pt denies  pain IV site .  Pt is advised to call for help  call bell with in the reach pt verbalized the understanding .  pending CDU  MD marte . GCS 15/15 A&OX 4 PERRLA  size 3 + Left eye Double vision  Strong upper & lower extremities steady gait   No facial droop  No Hand Leg drop denies numbness tingling Continue to monitor
Pt is on stretcher resting. Will continue to monitor.
On neuro exam, A&O x 4, PERRLA size 3, left eye does not track medially or downward on EOMs, +  L eye ptosis, strength equal, sensation intact, clear speech. Will continue to monitor.
07.00 Am Received the Pt from  SYDNEY Cooper . Pt is Observed for Left eye double vision  . Received the Pt A&OX 4 obeys commands Vero N/V/D fever chills cp SOB   Comfort care & safety measures continued  IV site looks clean & dry no signs of infiltration noted pt denies  pain IV site .  Pt is advised to call for help  call bell with in the reach pt verbalized the understanding .  pending CDU  MD marte . GCS 15/15 A&OX 4 PERRLA  size 3  C/O left eye double vision Strong upper & lower extremities steady gait   No facial droop  No Hand Leg drop denies numbness tingling Continue to monitor
On neuro exam, A&O x 4, PERRLA size 3, left eye does not track medially or downward on EOMs, +  L eye ptosis, pt has left eye double vision strength equal, sensation intact, clear speech. Will continue to monitor.
Pt received from SYDNEY Melvin. Pt oriented to CDU & plan of care was discussed. Pt A&O x 4. Pt in CDU for tele, neuro checks q 4, MRI brain and orbits, neuro following. Pt denies any eye pain, chest pain, SOB, dizziness, headache, lightheadedness, weakness, numbness, speech disturbances. Pt has left eye double vision. On neuro exam, A&O x 4, PERRLA size 3, left eye does not track medially or downward on EOMs, +  L eye ptosis, strength equal, sensation intact, clear speech. V/S stable, pt afebrile,  IV in place, patent and free of signs of infiltration. Pt resting in bed. Safety & comfort measures maintained. Call bell in reach. Will continue to monitor.

## 2022-09-07 NOTE — CHART NOTE - NSCHARTNOTEFT_GEN_A_CORE
MR imaging reviewed, no acute neurological pathology noted. 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 Would defer to optho regarding role of implant in current presentation and management of such. If medically stable cleared for discharge from a neurology standpoint. Can follow up with neurology outpatient: [facility] [phone]. Should follow up with optho outpatient as well.     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 There is a fluid collection surrounding the Baerveldt  implant, concerning for malfunction. There is approximately 4.3 mm of fluid superior to the implant disc and approximately 4.7 mm of fluid inferior to the implant disc. The collection contacts the left superior rectus and lateral rectus muscles.  Clinical correlation by ophthalmologist is needed. No acute intracranial hemorrhage or infarction. Scattered FLAIR hyperintense white matter foci in the periventricular white matter, compatible with mild to moderate chronic small vessel changes.        Case discussed with neurology attending Dr. Albarado

## 2022-09-07 NOTE — ED CDU PROVIDER SUBSEQUENT DAY NOTE - HISTORY
CDU PROGRESS NOTE PA ALYSSA: Pt resting comfortably, aox3, speaking NAD, VSS. No events on telemetry. No interval change from prior exam, +Left eye ptosis, Primary gaze showing left eye deviated to down and left; Left eye not able to fully adduct although crosses midline (CN 3 and 4 palsy). MRI completed, will continue to monitor and f/u results.

## 2022-09-07 NOTE — ED ADULT NURSE REASSESSMENT NOTE - NIH STROKE SCALE: 1C. LOC COMMANDS, QM
(0) Performs both tasks correctly
99

## 2022-09-07 NOTE — ED ADULT NURSE REASSESSMENT NOTE - NSIMPLEMENTINTERV_GEN_ALL_ED
Implemented All Fall with Harm Risk Interventions:  [place] to call system. Call bell, personal items and telephone within reach. Instruct patient to call for assistance. Room bathroom lighting operational. Non-slip footwear when patient is off stretcher. Physically safe environment: no spills, clutter or unnecessary equipment. Stretcher in lowest position, wheels locked, appropriate side rails in place. Provide visual cue, wrist band, yellow gown, etc. Monitor gait and stability. Monitor for mental status changes and reorient to person, place, and time. Review medications for side effects contributing to fall risk. Reinforce activity limits and safety measures with patient and family. Provide visual clues: red socks.

## 2022-09-08 LAB — ACE SERPL-CCNC: 50 U/L — SIGNIFICANT CHANGE UP (ref 14–82)

## 2022-09-09 LAB — ACRM BINDING ANTIBODY: <0.03 NMOL/L — SIGNIFICANT CHANGE UP (ref 0–0.24)

## 2022-09-10 LAB — ACHR MOD AB SER-ACNC: 0 % — SIGNIFICANT CHANGE UP

## 2022-09-11 LAB
A1C WITH ESTIMATED AVERAGE GLUCOSE RESULT: 7.7 % — HIGH (ref 4–5.6)
ESTIMATED AVERAGE GLUCOSE: 174 MG/DL — HIGH (ref 68–114)

## 2022-09-13 LAB — ACHR BLOCK AB SER-ACNC: 15 % — SIGNIFICANT CHANGE UP (ref 0–25)

## 2022-09-16 LAB
LRP4 AUTOANTIBODY TEST: SIGNIFICANT CHANGE UP
MUSK IGG SER IA-MCNC: SIGNIFICANT CHANGE UP

## 2022-09-30 ENCOUNTER — NON-APPOINTMENT (OUTPATIENT)
Age: 72
End: 2022-09-30

## 2022-09-30 ENCOUNTER — APPOINTMENT (OUTPATIENT)
Dept: OPHTHALMOLOGY | Facility: CLINIC | Age: 72
End: 2022-09-30

## 2022-09-30 PROCEDURE — 92012 INTRM OPH EXAM EST PATIENT: CPT | Mod: 24

## 2022-10-06 ENCOUNTER — APPOINTMENT (OUTPATIENT)
Dept: OPHTHALMOLOGY | Facility: CLINIC | Age: 72
End: 2022-10-06

## 2022-10-06 ENCOUNTER — NON-APPOINTMENT (OUTPATIENT)
Age: 72
End: 2022-10-06

## 2022-10-06 PROCEDURE — 99024 POSTOP FOLLOW-UP VISIT: CPT

## 2022-10-06 PROCEDURE — 66250 FOLLOW-UP SURGERY OF EYE: CPT | Mod: 78,LT

## 2022-10-13 PROBLEM — D31.31 CHOROIDAL NEVUS OF RIGHT EYE: Status: ACTIVE | Noted: 2018-12-18

## 2022-10-25 ENCOUNTER — APPOINTMENT (OUTPATIENT)
Dept: OPHTHALMOLOGY | Facility: CLINIC | Age: 72
End: 2022-10-25

## 2022-10-25 ENCOUNTER — NON-APPOINTMENT (OUTPATIENT)
Age: 72
End: 2022-10-25

## 2022-10-25 PROCEDURE — 99024 POSTOP FOLLOW-UP VISIT: CPT

## 2022-11-16 ENCOUNTER — NON-APPOINTMENT (OUTPATIENT)
Age: 72
End: 2022-11-16

## 2022-11-16 ENCOUNTER — APPOINTMENT (OUTPATIENT)
Dept: OPHTHALMOLOGY | Facility: CLINIC | Age: 72
End: 2022-11-16

## 2022-11-16 PROCEDURE — 92014 COMPRE OPH EXAM EST PT 1/>: CPT | Mod: 25,24

## 2022-11-16 PROCEDURE — 67028 INJECTION EYE DRUG: CPT | Mod: RT

## 2022-11-16 PROCEDURE — 92134 CPTRZ OPH DX IMG PST SGM RTA: CPT

## 2022-11-17 ENCOUNTER — NON-APPOINTMENT (OUTPATIENT)
Age: 72
End: 2022-11-17

## 2022-11-17 ENCOUNTER — APPOINTMENT (OUTPATIENT)
Dept: OPHTHALMOLOGY | Facility: CLINIC | Age: 72
End: 2022-11-17

## 2022-11-17 PROCEDURE — 92012 INTRM OPH EXAM EST PATIENT: CPT

## 2022-11-18 ENCOUNTER — APPOINTMENT (OUTPATIENT)
Dept: OPHTHALMOLOGY | Facility: CLINIC | Age: 72
End: 2022-11-18

## 2023-01-17 ENCOUNTER — APPOINTMENT (OUTPATIENT)
Dept: OPHTHALMOLOGY | Facility: CLINIC | Age: 73
End: 2023-01-17
Payer: MEDICARE

## 2023-01-17 ENCOUNTER — NON-APPOINTMENT (OUTPATIENT)
Age: 73
End: 2023-01-17

## 2023-01-17 PROCEDURE — 92012 INTRM OPH EXAM EST PATIENT: CPT

## 2023-01-18 ENCOUNTER — NON-APPOINTMENT (OUTPATIENT)
Age: 73
End: 2023-01-18

## 2023-01-18 ENCOUNTER — APPOINTMENT (OUTPATIENT)
Dept: OPHTHALMOLOGY | Facility: CLINIC | Age: 73
End: 2023-01-18
Payer: MEDICARE

## 2023-01-18 PROCEDURE — 92134 CPTRZ OPH DX IMG PST SGM RTA: CPT

## 2023-01-18 PROCEDURE — 67028 INJECTION EYE DRUG: CPT | Mod: RT

## 2023-03-29 ENCOUNTER — APPOINTMENT (OUTPATIENT)
Dept: OPHTHALMOLOGY | Facility: CLINIC | Age: 73
End: 2023-03-29
Payer: MEDICARE

## 2023-03-29 ENCOUNTER — NON-APPOINTMENT (OUTPATIENT)
Age: 73
End: 2023-03-29

## 2023-03-29 PROCEDURE — 92012 INTRM OPH EXAM EST PATIENT: CPT | Mod: 25

## 2023-03-29 PROCEDURE — 92134 CPTRZ OPH DX IMG PST SGM RTA: CPT

## 2023-03-29 PROCEDURE — 67028 INJECTION EYE DRUG: CPT | Mod: RT

## 2023-04-13 ENCOUNTER — NON-APPOINTMENT (OUTPATIENT)
Age: 73
End: 2023-04-13

## 2023-04-13 ENCOUNTER — APPOINTMENT (OUTPATIENT)
Dept: OPHTHALMOLOGY | Facility: CLINIC | Age: 73
End: 2023-04-13
Payer: MEDICARE

## 2023-04-13 PROCEDURE — 92012 INTRM OPH EXAM EST PATIENT: CPT

## 2023-06-07 ENCOUNTER — APPOINTMENT (OUTPATIENT)
Dept: OPHTHALMOLOGY | Facility: CLINIC | Age: 73
End: 2023-06-07

## 2023-08-29 ENCOUNTER — APPOINTMENT (OUTPATIENT)
Dept: OPHTHALMOLOGY | Facility: CLINIC | Age: 73
End: 2023-08-29